# Patient Record
Sex: MALE | Race: WHITE
[De-identification: names, ages, dates, MRNs, and addresses within clinical notes are randomized per-mention and may not be internally consistent; named-entity substitution may affect disease eponyms.]

---

## 2020-07-16 ENCOUNTER — HOSPITAL ENCOUNTER (EMERGENCY)
Dept: HOSPITAL 11 - JP.ED | Age: 65
Discharge: HOME | End: 2020-07-16
Payer: MEDICARE

## 2020-07-16 DIAGNOSIS — E11.65: ICD-10-CM

## 2020-07-16 DIAGNOSIS — L97.512: ICD-10-CM

## 2020-07-16 DIAGNOSIS — Z88.8: ICD-10-CM

## 2020-07-16 DIAGNOSIS — L97.522: ICD-10-CM

## 2020-07-16 DIAGNOSIS — I48.91: ICD-10-CM

## 2020-07-16 DIAGNOSIS — I50.9: ICD-10-CM

## 2020-07-16 DIAGNOSIS — E11.40: ICD-10-CM

## 2020-07-16 DIAGNOSIS — E11.621: Primary | ICD-10-CM

## 2020-07-16 DIAGNOSIS — L03.116: ICD-10-CM

## 2020-07-16 DIAGNOSIS — L03.115: ICD-10-CM

## 2020-07-16 DIAGNOSIS — Z79.899: ICD-10-CM

## 2020-07-16 DIAGNOSIS — Z20.828: ICD-10-CM

## 2020-07-16 LAB — HBA1C BLD-MCNC: 7 % (ref 4.5–6.2)

## 2020-07-16 PROCEDURE — 99285 EMERGENCY DEPT VISIT HI MDM: CPT

## 2020-07-16 PROCEDURE — U0002 COVID-19 LAB TEST NON-CDC: HCPCS

## 2020-07-16 PROCEDURE — 99284 EMERGENCY DEPT VISIT MOD MDM: CPT

## 2020-07-16 PROCEDURE — 93005 ELECTROCARDIOGRAM TRACING: CPT

## 2020-07-16 PROCEDURE — 93010 ELECTROCARDIOGRAM REPORT: CPT

## 2020-07-16 PROCEDURE — 80053 COMPREHEN METABOLIC PANEL: CPT

## 2020-07-16 PROCEDURE — 83036 HEMOGLOBIN GLYCOSYLATED A1C: CPT

## 2020-07-16 PROCEDURE — 36415 COLL VENOUS BLD VENIPUNCTURE: CPT

## 2020-07-16 PROCEDURE — 85025 COMPLETE CBC W/AUTO DIFF WBC: CPT

## 2020-07-16 PROCEDURE — 86140 C-REACTIVE PROTEIN: CPT

## 2020-07-16 PROCEDURE — 73630 X-RAY EXAM OF FOOT: CPT

## 2020-07-16 PROCEDURE — 83880 ASSAY OF NATRIURETIC PEPTIDE: CPT

## 2020-07-16 PROCEDURE — 85651 RBC SED RATE NONAUTOMATED: CPT

## 2020-07-16 PROCEDURE — 71046 X-RAY EXAM CHEST 2 VIEWS: CPT

## 2020-07-16 PROCEDURE — 84484 ASSAY OF TROPONIN QUANT: CPT

## 2020-07-16 NOTE — EDM.PDOC
ED HPI GENERAL MEDICAL PROBLEM





- General


Chief Complaint: General


Stated Complaint: EVAL


Time Seen by Provider: 07/16/20 18:05


Source of Information: Reports: Patient


History Limitations: Reports: No Limitations





- History of Present Illness


INITIAL COMMENTS - FREE TEXT/NARRATIVE: 





Patient presents for evaluation of generalized achiness, fatigue, increasing 

foot pain, shortness of breath, chest discomfort.  He has a complicated medical 

history with multiple comorbidities.  He was last seen by a physician when he 

was hospitalized in her room approximately 1 year ago.  He states that he has 

not gone into any clinics since that time.  He has in-home personal assistance 9

hours/day for bathing and other items.  When asked why he came tonight, he 

states that he went into his local clinic but given everything he described and 

the possibility he might have the coronavirus, he was referred here.  His wife 

drove him here.  He has had low-grade feelings of warmth but no chills.  Some 

chronic intermittent coughing and shortness of breath although he spends most of

his day in a wheelchair.  He denies chest pain but describes things as chest 

discomfort.  He has had some nausea but has not vomited routinely.  He will eat 

some food but then be full quickly and will not eat anymore.  He is not been on 

a scale for a long time and he has no idea if he is losing weight, gaining 

weight or holding his own.  He has had pain in his legs for a long time he 

states since he was diagnosed with congestive heart failure.  He cannot list his

medications but his wife manages them.  He is frustrated with the fact that 

others have to take care of him.  She controls all of his medications now out of

concern that he might attempt to overdose.  Many years ago he was hospitalized 

with suicidal thoughts at the time of pending divorce.  He has had no 

psychiatric hospitalizations since then.  He considers his suicide risk low at 

this time.  As noted above he was hospitalized in [place] approximately 1 year 

ago but the details of that visit are incomplete.


Onset: Gradual


Duration: Week(s):, Chronic, Intermittent


Location: Reports: Head, Chest, Abdomen, Lower Extremity, Left, Lower Extremity,

Right, Generalized


Quality: Reports: Ache, Burning, Throbbing


Severity: Moderate


Improves with: Reports: None


Worsens with: Reports: Movement


Associated Symptoms: Reports: Chest Pain, Cough, Headaches, Loss of Appetite, 

Malaise, Nausea/Vomiting, Rash, Shortness of Breath, Weakness.  Denies: 

Diaphoresis, Fever/Chills, Syncope


  ** Bilateral Feet


Pain Score (Numeric/FACES): 10





- Related Data


                                    Allergies











Allergy/AdvReac Type Severity Reaction Status Date / Time


 


carvedilol [From Coreg] Allergy  Other Verified 07/16/20 18:37


 


gabapentin Allergy  Hives Verified 07/16/20 18:37


 


spironolactone Allergy  Hives Verified 07/16/20 18:37











Home Meds: 


                                    Home Meds





Allopurinol [Zyloprim] 300 mg PO DAILY 07/16/20 [History]


Colchicine [Colcrys] 0.6 mg PO TID PRN 07/16/20 [History]


Cyclobenzaprine [Flexeril] 10 mg PO TID PRN 07/16/20 [History]


Diltiazem HCl [Diltiazem 24Hr ER] 120 mg PO DAILY 07/16/20 [History]


Furosemide 40 mg PO DAILY 07/16/20 [History]


Magnesium Oxide 800 mg PO BID 07/16/20 [History]


Metoprolol Succinate 75 mg PO DAILY 07/16/20 [History]


Potassium Chloride 40 meq PO BID 07/16/20 [History]


Sertraline [Zoloft] 25 mg PO DAILY 07/16/20 [History]


atorvaSTATin [Lipitor] 10 mg PO DAILY 07/16/20 [History]


hydrALAZINE [Apresoline] 25 mg PO Q8H 07/16/20 [History]


metOLazone [Metolazone] 5 mg PO DAILY 07/16/20 [History]


traZODone 100 mg PO BEDTIME 07/16/20 [History]











Past Medical History


HEENT History: Reports: Other (See Below)


Other HEENT History: esophageal varices


Cardiovascular History: Reports: Afib, Heart Failure


Psychiatric History: Reports: Addiction


Endocrine/Metabolic History: Reports: Diabetes, Type II





- Infectious Disease History


Infectious Disease History: Reports: Chicken Pox





- Past Surgical History


GI Surgical History: Reports: Cholecystectomy





Social & Family History





- Tobacco Use


Smoking Status *Q: Never Smoker





- Caffeine Use


Caffeine Use: Reports: Other





- Alcohol Use


Days Per Week of Alcohol Use: 7


Number of Drinks Per Day: 3


Total Drinks Per Week: 21





- Recreational Drug Use


Recreational Drug Use: No





ED ROS GENERAL





- Review of Systems


Review Of Systems: See Below


Constitutional: Reports: Malaise, Weakness, Fatigue, Decreased Appetite.  

Denies: Diaphoresis


Respiratory: Reports: Shortness of Breath, Cough.  Denies: Wheezing


Cardiovascular: Reports: Chest Pain, Dyspnea on Exertion


GI/Abdominal: Reports: Decreased Appetite, Nausea, Vomiting.  Denies: 

Constipation, Diarrhea, Difficulty Swallowing, Hematochezia, Melena


Musculoskeletal: Reports: Foot Pain, Muscle Pain


Neurological: Reports: Headache


Psychiatric: Reports: Anxiety





ED EXAM, GENERAL





- Physical Exam


Exam: See Below


Free Text/Narrative:: 





This is an adult male seated in an oversized wheelchair.  He chronicles his 

history but sounds tired and frustrated.


Exam Limited By: No Limitations


General Appearance: Alert


Neck: Normal Inspection


Respiratory/Chest: Normal Breath Sounds.  No: No Accessory Muscle Use


Cardiovascular: Tachycardia, Irregularly Irregular


Peripheral Pulses: 2+: Posterior Tibial (L), Posterior Tibial (R)


GI/Abdominal: Soft.  No: Distended


Extremities: Other (There are varying thickness ulcers on both feet, the left 

foot shows a great toe ulcer with fissuring and black discoloration of the skin.

  There is an ulcer on the medial aspect of the left calcaneus.  The right foot 

shows an ulcer on the distal lateral forefoot.  There is diffuse tenderness.  

Some exudate present in the wounds.)


Neurological: Other (Bilateral foot neuropathy.)


Psychiatric: Depressed Mood, Flat Affect





EKG INTERPRETATION


EKG Date: 07/16/20


Time: 18:39


Rhythm: A-Fib


Axis: LAD-Left Axis Deviation


P-Wave: Absent


QRS: Normal


ST-T: Normal


QT: Normal


EKG Interpretation Comments: 





Atrial fibrillation with rapid ventricular response.





Course





- Vital Signs


Last Recorded V/S: 


                                Last Vital Signs











Temp  35.6 C L  07/16/20 17:31


 


Pulse  78   07/16/20 17:31


 


Resp  18   07/16/20 17:31


 


BP  139/89   07/16/20 17:31


 


Pulse Ox  95   07/16/20 17:31














- Orders/Labs/Meds


Orders: 


                               Active Orders 24 hr











 Category Date Time Status


 


 EKG Documentation Completion [RC] ASDIRECTED Care  07/16/20 18:35 Ordered


 


 Chest 2V [CR] Stat Exams  07/16/20 18:33 Ordered


 


 Foot Comp Min 3V Bi [CR] Stat Exams  07/16/20 18:33 Ordered


 


 CORONAVIRUS COVID-19, SIMA Routine Lab  07/16/20 18:35 Ordered


 


 GLYCOSYLATED HEMOGLOBIN,HGBA1C [CHEM] Stat Lab  07/16/20 19:55 Ordered


 


 EKG 12 Lead [EK] Routine Ther  07/16/20 18:34 Ordered











Labs: 


                                Laboratory Tests











  07/16/20 07/16/20 07/16/20 Range/Units





  18:54 18:54 18:54 


 


WBC  10.2    (4.5-11.0)  K/uL


 


RBC  5.27    (4.30-5.90)  M/uL


 


Hgb  15.0    (12.0-15.0)  g/dL


 


Hct  47.4    (40.0-54.0)  %


 


MCV  90    (80-98)  fL


 


MCH  29    (27-31)  pg


 


MCHC  32    (32-36)  %


 


Plt Count  293    (150-400)  K/uL


 


Neut % (Auto)  74 H    (36-66)  %


 


Lymph % (Auto)  12 L    (24-44)  %


 


Mono % (Auto)  12 H    (2-6)  %


 


Eos % (Auto)  1 L    (2-4)  %


 


Baso % (Auto)  1    (0-1)  %


 


ESR    87 H  (0-20)  mm/hr


 


Sodium   140   (140-148)  mmol/L


 


Potassium   4.0   (3.6-5.2)  mmol/L


 


Chloride   100   (100-108)  mmol/L


 


Carbon Dioxide   28   (21-32)  mmol/L


 


Anion Gap   12.3   (5.0-14.0)  mmol/L


 


BUN   15   (7-18)  mg/dL


 


Creatinine   1.5 H   (0.8-1.3)  mg/dL


 


Est Cr Clr Drug Dosing   47.50   mL/min


 


Estimated GFR (MDRD)   47 L   (>60)  


 


Glucose   200 H   ()  mg/dL


 


Calcium   8.6   (8.5-10.1)  mg/dL


 


Total Bilirubin   0.7   (0.2-1.0)  mg/dL


 


AST   52 H   (15-37)  U/L


 


ALT   31   (12-78)  U/L


 


Alkaline Phosphatase   180 H   ()  U/L


 


Troponin I   < 0.017   (0.000-0.056)  ng/mL


 


C-Reactive Protein   2.30 H   (0.0-0.3)  mg/dL


 


NT-Pro-B Natriuret Pep     (5-125)  pg/mL


 


Total Protein   8.2   (6.4-8.2)  g/dL


 


Albumin   2.8 L   (3.4-5.0)  g/dL


 


Globulin   5.4 H   (2.3-3.5)  g/dL


 


Albumin/Globulin Ratio   0.5 L   (1.2-2.2)  














  07/16/20 Range/Units





  18:55 


 


WBC   (4.5-11.0)  K/uL


 


RBC   (4.30-5.90)  M/uL


 


Hgb   (12.0-15.0)  g/dL


 


Hct   (40.0-54.0)  %


 


MCV   (80-98)  fL


 


MCH   (27-31)  pg


 


MCHC   (32-36)  %


 


Plt Count   (150-400)  K/uL


 


Neut % (Auto)   (36-66)  %


 


Lymph % (Auto)   (24-44)  %


 


Mono % (Auto)   (2-6)  %


 


Eos % (Auto)   (2-4)  %


 


Baso % (Auto)   (0-1)  %


 


ESR   (0-20)  mm/hr


 


Sodium   (140-148)  mmol/L


 


Potassium   (3.6-5.2)  mmol/L


 


Chloride   (100-108)  mmol/L


 


Carbon Dioxide   (21-32)  mmol/L


 


Anion Gap   (5.0-14.0)  mmol/L


 


BUN   (7-18)  mg/dL


 


Creatinine   (0.8-1.3)  mg/dL


 


Est Cr Clr Drug Dosing   mL/min


 


Estimated GFR (MDRD)   (>60)  


 


Glucose   ()  mg/dL


 


Calcium   (8.5-10.1)  mg/dL


 


Total Bilirubin   (0.2-1.0)  mg/dL


 


AST   (15-37)  U/L


 


ALT   (12-78)  U/L


 


Alkaline Phosphatase   ()  U/L


 


Troponin I   (0.000-0.056)  ng/mL


 


C-Reactive Protein   (0.0-0.3)  mg/dL


 


NT-Pro-B Natriuret Pep  1065 H  (5-125)  pg/mL


 


Total Protein   (6.4-8.2)  g/dL


 


Albumin   (3.4-5.0)  g/dL


 


Globulin   (2.3-3.5)  g/dL


 


Albumin/Globulin Ratio   (1.2-2.2)  














- Re-Assessments/Exams


Free Text/Narrative Re-Assessment/Exam: 





07/16/20 20:01


I reviewed findings with patient and his wife.  Blood glucose is 200.  Troponin 

is okay.  He has renal function shows a creatinine of 1.5.  His wife has some 

recall for medications that he takes however he has not been taking any of them 

regularly.  He is insistent that he does not want to be hospitalized.  He is on 

only 120 mg of sustained-release diltiazem.  I am sending him with a 

prescription for 14 tablets of the 180 mg strength.  I discussed that once his 

heart rate is more controlled, he will have more energy.  His wife questions the

 dose currently of his metoprolol.  I reminded the patient and his wife several 

times that consistency is the key to getting him to feel better.  He lists a 

dose of only 25 mg of sertraline.  Given his frustration with his chronic 

conditions, he needs to take that every day, which she has not been.  After 1 

week, he could increase the dose to 50 mg and likely it will have to go higher. 

 His ulcers will need evaluation by podiatry or surgery and likely will need 

some debridement.  He should schedule a recheck time with his primary care 

provider for 10 to 14 days from now.  If he if he feels worse in any way, he can

 return here.  Reviewed with both of them that there is no quick, simple 

solution to getting him to feel better.





Departure





- Departure


Time of Disposition: 19:52


Disposition: Home, Self-Care 01


Condition: Fair


Clinical Impression: 


 Atrial fibrillation with rapid ventricular response, Hyperglycemia, Cellulitis,

 CHF, Congestive heart failure





Diabetic foot ulcers


Qualifiers:


 Diabetic foot ulcer location: unspecified part of foot Diabetes mellitus type: 

type 2 Laterality: unspecified laterality Non-pressure ulcer stage: with fat 

layer exposed Qualified Code(s): E11.621 - Type 2 diabetes mellitus with foot 

ulcer








- Discharge Information


Instructions:  Preventing Diabetes Mellitus Complications


Referrals: 


Sven Mills NP [Primary Care Provider] - 


Forms:  ED Department Discharge


Additional Instructions: 


Take a dose of your diltiazem when you get home tonight.  Tomorrow, fill the 

prescriptions for the antibiotic for your feet and the new higher dose of 

diltiazem.  Continue your other medicines the same.  Make an appointment for 10 

to 14 days from now with Dr. Machuca in [place].  You need to be on all of your 

medicines regularly for 10 days in order to best figure out how to help you.  

There are several conditions that need tuning up and only time and effort will 

help that.  Your COVID-19 test will be available in about a week based on 

current turnaround time.  If you feel worse in any way return to your clinic in 

[place] or this emergency department.





Sepsis Event Note (ED)





- Evaluation


Sepsis Screening Result: No Definite Risk





- Focused Exam


Vital Signs: 


                                   Vital Signs











  Temp Pulse Resp BP Pulse Ox


 


 07/16/20 17:31  35.6 C L  78  18  139/89  95


 


 07/16/20 17:24  35.6 C L  78  18  139/89  95














- My Orders


Last 24 Hours: 


My Active Orders





07/16/20 18:33


Chest 2V [CR] Stat 


Foot Comp Min 3V Bi [CR] Stat 





07/16/20 18:34


EKG 12 Lead [EK] Routine 





07/16/20 18:35


EKG Documentation Completion [RC] ASDIRECTED 


CORONAVIRUS COVID-19, SIMA Routine 





07/16/20 19:55


GLYCOSYLATED HEMOGLOBIN,HGBA1C [CHEM] Stat 














- Assessment/Plan


Last 24 Hours: 


My Active Orders





07/16/20 18:33


Chest 2V [CR] Stat 


Foot Comp Min 3V Bi [CR] Stat 





07/16/20 18:34


EKG 12 Lead [EK] Routine 





07/16/20 18:35


EKG Documentation Completion [RC] ASDIRECTED 


CORONAVIRUS COVID-19, SIMA Routine 





07/16/20 19:55


GLYCOSYLATED HEMOGLOBIN,HGBA1C [CHEM] Stat

## 2020-07-17 NOTE — CR
FOOT RIGHT 3 views

 

CLINICAL HISTORY:Multiple ulcers, bone pain

 

FINDINGS:There is a large the ulceration medial to the first MTP joint. There is

mild diffuse osteophytic changes. No definite focal lesion is identified.

 

Impression: Large ulcer base of first toe.

 

No underlying lytic lesion is identified. If osteomyelitis is suspected, a 3

phase bone scan is consideration on a nonemergent basis

 

Mild osteoarthritic change

## 2020-07-17 NOTE — CR
CHEST: 2 view

 

CLINICAL HISTORY:Chest pain

 

COMPARISON:None

 

FINDINGS: There is moderate breathing motion on the lateral image. Heart is

mildly enlarged. No infiltrate effusion or pneumothorax is seen. There is

granuloma in the left midlung field. There are atherosclerotic changes in the

aorta.

 

Impression: Mild cardiomegaly

 

No acute cardiopulmonary process.

## 2020-07-19 ENCOUNTER — HOSPITAL ENCOUNTER (INPATIENT)
Dept: HOSPITAL 11 - JP.ED | Age: 65
LOS: 3 days | Discharge: HOME | DRG: 871 | End: 2020-07-22
Attending: INTERNAL MEDICINE | Admitting: INTERNAL MEDICINE
Payer: MEDICARE

## 2020-07-19 DIAGNOSIS — R65.20: ICD-10-CM

## 2020-07-19 DIAGNOSIS — I50.9: ICD-10-CM

## 2020-07-19 DIAGNOSIS — Z90.49: ICD-10-CM

## 2020-07-19 DIAGNOSIS — E11.9: ICD-10-CM

## 2020-07-19 DIAGNOSIS — E11.621: ICD-10-CM

## 2020-07-19 DIAGNOSIS — A41.9: Primary | ICD-10-CM

## 2020-07-19 DIAGNOSIS — L03.116: ICD-10-CM

## 2020-07-19 DIAGNOSIS — I48.0: ICD-10-CM

## 2020-07-19 DIAGNOSIS — Z88.8: ICD-10-CM

## 2020-07-19 DIAGNOSIS — L97.422: ICD-10-CM

## 2020-07-19 DIAGNOSIS — Z79.82: ICD-10-CM

## 2020-07-19 DIAGNOSIS — E87.5: ICD-10-CM

## 2020-07-19 DIAGNOSIS — R40.1: ICD-10-CM

## 2020-07-19 DIAGNOSIS — Z66: ICD-10-CM

## 2020-07-19 DIAGNOSIS — E66.01: ICD-10-CM

## 2020-07-19 DIAGNOSIS — Z79.84: ICD-10-CM

## 2020-07-19 DIAGNOSIS — E11.628: ICD-10-CM

## 2020-07-19 DIAGNOSIS — Z79.899: ICD-10-CM

## 2020-07-19 DIAGNOSIS — N17.1: ICD-10-CM

## 2020-07-19 DIAGNOSIS — Z79.01: ICD-10-CM

## 2020-07-19 DIAGNOSIS — Z20.828: ICD-10-CM

## 2020-07-19 PROCEDURE — 84484 ASSAY OF TROPONIN QUANT: CPT

## 2020-07-19 PROCEDURE — 36600 WITHDRAWAL OF ARTERIAL BLOOD: CPT

## 2020-07-19 PROCEDURE — 36415 COLL VENOUS BLD VENIPUNCTURE: CPT

## 2020-07-19 PROCEDURE — 96368 THER/DIAG CONCURRENT INF: CPT

## 2020-07-19 PROCEDURE — U0002 COVID-19 LAB TEST NON-CDC: HCPCS

## 2020-07-19 PROCEDURE — 83605 ASSAY OF LACTIC ACID: CPT

## 2020-07-19 PROCEDURE — 93005 ELECTROCARDIOGRAM TRACING: CPT

## 2020-07-19 PROCEDURE — 87040 BLOOD CULTURE FOR BACTERIA: CPT

## 2020-07-19 PROCEDURE — 51702 INSERT TEMP BLADDER CATH: CPT

## 2020-07-19 PROCEDURE — 82803 BLOOD GASES ANY COMBINATION: CPT

## 2020-07-19 PROCEDURE — 85025 COMPLETE CBC W/AUTO DIFF WBC: CPT

## 2020-07-19 PROCEDURE — 99285 EMERGENCY DEPT VISIT HI MDM: CPT

## 2020-07-19 PROCEDURE — 93010 ELECTROCARDIOGRAM REPORT: CPT

## 2020-07-19 PROCEDURE — 80305 DRUG TEST PRSMV DIR OPT OBS: CPT

## 2020-07-19 PROCEDURE — 81001 URINALYSIS AUTO W/SCOPE: CPT

## 2020-07-19 PROCEDURE — 96365 THER/PROPH/DIAG IV INF INIT: CPT

## 2020-07-19 PROCEDURE — 70450 CT HEAD/BRAIN W/O DYE: CPT

## 2020-07-19 PROCEDURE — 80053 COMPREHEN METABOLIC PANEL: CPT

## 2020-07-19 PROCEDURE — 71250 CT THORAX DX C-: CPT

## 2020-07-19 RX ADMIN — Medication SCH: at 20:42

## 2020-07-19 RX ADMIN — INSULIN LISPRO SCH UNIT: 100 INJECTION, SOLUTION INTRAVENOUS; SUBCUTANEOUS at 17:54

## 2020-07-19 RX ADMIN — LORAZEPAM PRN MG: 2 INJECTION INTRAMUSCULAR; INTRAVENOUS at 20:30

## 2020-07-19 NOTE — CRLCT
Mental status change.



TECHNIQUE:



Noncontrast CT chest.



No comparison studies are available. 



Findings: 



Normal caliber thoracic aorta. The heart is enlarged. There is no 

pericardial effusion. No mediastinal or hilar adenopathy.



Granuloma in the left upper lobe minimal basilar atelectasis. No effusion.



Nodular contour to the liver. Cholecystectomy.



Spleen adrenal glands unremarkable pancreas is unremarkable no suspicious 

bony lesions.



IMPRESSION:



No acute pulmonary findings.



Nodular contour to the liver which may reflect cirrhosis.



Please note that all CT scans at this facility use dose modulation, 

iterative reconstruction, and/or weight-based dosing when appropriate to 

reduce radiation dose to as low as reasonably achievable.



Dictated by Lenora Watters MD @ Jul 19 2020  2:02PM



Signed by Dr. Lenora Watters @ Jul 19 2020  2:05PM

## 2020-07-19 NOTE — PCM.HP.2
H&P History of Present Illness





- General


Date of Service: 07/19/20


Admit Problem/Dx: 


                           Admission Diagnosis/Problem





Admission Diagnosis/Problem      Acute kidney injury








Source of Information: Provider.  No: Patient


History Limitations: Reports: Altered Mental Status





- History of Present Illness


Initial Comments - Free Text/Narative: 





CC: I can't breathe





HPI: Jose to the ER via ambulance from his home. He is very lethargic and able 

to provide only minimal hx. history is gathered from his emergency room 

providers and they did receive information from his significant other and pa

ramedics.  Patient was seen in the emergency room a few days ago with aches and 

pains and just generally not feeling well.  He was noted to have a fast rate 

with his atrial fibrillation and his diltiazem was increased.  He did not want 

to be admitted to the hospital at this time though there was some concern about 

cellulitis around 1 of his diabetic foot ulcers, notably on the right foot.  He 

was sent home with antibiotics.  Last night his significant other reported that 

he vomited after eating a small amount of supper.  He had multiple rounds of 

emesis and then fell asleep in his wheelchair.  This morning he was very 

lethargic so she called 911.





Work-up in the emergency room has raised concern for sepsis with significant 

leukocytosis and lactic acidosis though the patient's vital signs are stable.  

No obvious source of infection has been found so far.  His kidney function is 

down considerably from his baseline.  He has received IV antibiotics.  Cultures 

have been obtained.  He has received IV fluids.





- Related Data


Allergies/Adverse Reactions: 


                                    Allergies











Allergy/AdvReac Type Severity Reaction Status Date / Time


 


carvedilol [From Coreg] Allergy  Other Verified 07/16/20 18:37


 


gabapentin Allergy  Hives Verified 07/16/20 18:37


 


spironolactone Allergy  Hives Verified 07/16/20 18:37











Home Medications: 


                                    Home Meds





Allopurinol [Zyloprim] 300 mg PO DAILY 07/16/20 [History]


Colchicine [Colcrys] 0.6 mg PO TID PRN 07/16/20 [History]


Cyclobenzaprine [Flexeril] 10 mg PO TID PRN 07/16/20 [History]


Diltiazem HCl [Diltiazem 24Hr ER] 120 mg PO DAILY 07/16/20 [History]


Furosemide 40 mg PO DAILY 07/16/20 [History]


Magnesium Oxide 800 mg PO BID 07/16/20 [History]


Metoprolol Succinate 75 mg PO DAILY 07/16/20 [History]


Potassium Chloride 40 meq PO BID 07/16/20 [History]


Sertraline [Zoloft] 25 mg PO DAILY 07/16/20 [History]


atorvaSTATin [Lipitor] 10 mg PO DAILY 07/16/20 [History]


hydrALAZINE [Apresoline] 25 mg PO Q8H 07/16/20 [History]


metOLazone [Metolazone] 5 mg PO DAILY 07/16/20 [History]


traZODone 100 mg PO BEDTIME 07/16/20 [History]











Past Medical History


HEENT History: Reports: Other (See Below)


Other HEENT History: esophageal varices


Cardiovascular History: Reports: Afib, Heart Failure


Psychiatric History: Reports: Addiction


Endocrine/Metabolic History: Reports: Diabetes, Type II





- Infectious Disease History


Infectious Disease History: Reports: Chicken Pox





- Past Surgical History


GI Surgical History: Reports: Cholecystectomy





Social & Family History





- Family History


Family Medical History: Unobtainable (pt minimally responsive)





- Tobacco Use


Smoking Status *Q: Unknown Ever Smoked





- Caffeine Use


Caffeine Use: Reports: Other


Caffeine Use Comment: unable to obtain





H&P Review of Systems





- Review of Systems:


Review Of Systems: Unable To Obtain


Reason Not Obtained: minimally responsive, questions he did answer in HPI





Exam





- Exam


Exam: See Below





- Vital Signs


Vital Signs: 


                                Last Vital Signs











Temp  36.2 C   07/19/20 13:25


 


Pulse  50 L  07/19/20 14:25


 


Resp  10 L  07/19/20 14:25


 


BP  115/79   07/19/20 14:25


 


Pulse Ox  96   07/19/20 14:25











Weight: 151.93 kg





- Exam


Quality Assessment: No: Supplemental Oxygen


General: Alert, Cooperative, Lethargic.  No: Oriented, Mild Distress


HEENT: Conjunctiva Clear.  No: Mucosa Moist & Pink (dry), Scleral Icterus


Neck: Supple, Trachea Midline.  No: JVD


Lungs: Clear to Auscultation, Normal Respiratory Effort


Cardiovascular: Regular Rhythm, Bradycardia.  No: Systolic Murmur, Gallop/S3


GI/Abdominal Exam: Normal Bowel Sounds, Soft, Non-Tender, No Distention, Other 

(obese)


Extremities: Pedal Edema, Other (Large ulcer base of right great toe medially. 

No exudate or erythema. Large ulcer medial left heal with no drainage or e

rythema. No fluctuance around either wound. small abrasions left great toe).  

No: Joint Swelling, Increased Warmth





- Patient Data


Lab Results Last 24 hrs: 


                         Laboratory Results - last 24 hr











  07/19/20 07/19/20 07/19/20 Range/Units





  12:52 13:00 13:00 


 


WBC   20.5 H   (4.5-11.0)  K/uL


 


RBC   4.77   (4.30-5.90)  M/uL


 


Hgb   13.8   (12.0-15.0)  g/dL


 


Hct   44.6   (40.0-54.0)  %


 


MCV   94   (80-98)  fL


 


MCH   29   (27-31)  pg


 


MCHC   31 L   (32-36)  %


 


Plt Count   341   (150-400)  K/uL


 


Neut % (Auto)   93 H   (36-66)  %


 


Lymph % (Auto)   3 L   (24-44)  %


 


Mono % (Auto)   4   (2-6)  %


 


Eos % (Auto)   0 L   (2-4)  %


 


Baso % (Auto)   0   (0-1)  %


 


Puncture Site  Rt brachial    


 


ABG pH  7.363    (7.350-7.450)  


 


ABG pCO2  40.4    (35.0-42.0)  mmHg


 


ABG pO2  101.0 H    (75.0-100.0)  mmHg


 


ABG HCO3  22.4    (22.0-26.0)  mmol/L


 


ABG Total CO2  20.0 L    (23.0-27.0)  mmol/L


 


ABG O2 Saturation  97.1    (95.0-98.0)  %


 


ABG O2 Content  18.6    (15.0-23.0)  %vol


 


ABG Base Excess  -2.3    mm/L


 


ABG Hemoglobin  13.8    (13.5-18.0)  g/dL


 


ABG Oxyhemoglobin  94.9    %


 


ABG Carboxyhemoglobin  1.6    (0.0-1.6)  %


 


ABG Methemoglobin  0.7    %


 


Stefano Test  N/a    


 


O2 Delivery Device  Room air    


 


Sodium    134 L  (140-148)  mmol/L


 


Potassium    5.5 H  (3.6-5.2)  mmol/L


 


Chloride    96 L  (100-108)  mmol/L


 


Carbon Dioxide    24  (21-32)  mmol/L


 


Anion Gap    19.5 H  (5.0-14.0)  mmol/L


 


BUN    22 H  (7-18)  mg/dL


 


Creatinine    2.7 H D  (0.8-1.3)  mg/dL


 


Est Cr Clr Drug Dosing    TNP  


 


Estimated GFR (MDRD)    24 L  (>60)  


 


Glucose    215 H  ()  mg/dL


 


Lactic Acid     (0.4-2.0)  mmol/L


 


Calcium    8.3 L  (8.5-10.1)  mg/dL


 


Total Bilirubin    1.1 H D  (0.2-1.0)  mg/dL


 


AST    62 H  (15-37)  U/L


 


ALT    45  (12-78)  U/L


 


Alkaline Phosphatase    179 H  ()  U/L


 


Troponin I    < 0.017  (0.000-0.056)  ng/mL


 


Total Protein    8.6 H  (6.4-8.2)  g/dL


 


Albumin    3.1 L  (3.4-5.0)  g/dL


 


Globulin    5.5 H  (2.3-3.5)  g/dL


 


Albumin/Globulin Ratio    0.6 L  (1.2-2.2)  


 


Urine Color     (YELLOW)  


 


Urine Appearance     (CLEAR)  


 


Urine pH     (5.0-8.0)  


 


Ur Specific Gravity     (1.008-1.030)  


 


Urine Protein     (NEGATIVE)  mg/dL


 


Urine Glucose (UA)     (NEGATIVE)  mg/dL


 


Urine Ketones     (NEGATIVE)  mg/dL


 


Urine Occult Blood     (NEGATIVE)  


 


Urine Nitrite     (NEGATIVE)  


 


Urine Bilirubin     (NEGATIVE)  


 


Urine Urobilinogen     (0.2-1.0)  EU/dL


 


Ur Leukocyte Esterase     (NEGATIVE)  


 


Urine RBC     (0-5)  


 


Urine WBC     (0-5)  


 


Ur Epithelial Cells     


 


Amorphous Sediment     


 


Urine Bacteria     


 


Urine Mucus     


 


Urine Other     


 


SARS Virus RNA (PCR)     (NEGATIVE)  














  07/19/20 07/19/20 07/19/20 Range/Units





  13:00 14:28 14:46 


 


WBC     (4.5-11.0)  K/uL


 


RBC     (4.30-5.90)  M/uL


 


Hgb     (12.0-15.0)  g/dL


 


Hct     (40.0-54.0)  %


 


MCV     (80-98)  fL


 


MCH     (27-31)  pg


 


MCHC     (32-36)  %


 


Plt Count     (150-400)  K/uL


 


Neut % (Auto)     (36-66)  %


 


Lymph % (Auto)     (24-44)  %


 


Mono % (Auto)     (2-6)  %


 


Eos % (Auto)     (2-4)  %


 


Baso % (Auto)     (0-1)  %


 


Puncture Site     


 


ABG pH     (7.350-7.450)  


 


ABG pCO2     (35.0-42.0)  mmHg


 


ABG pO2     (75.0-100.0)  mmHg


 


ABG HCO3     (22.0-26.0)  mmol/L


 


ABG Total CO2     (23.0-27.0)  mmol/L


 


ABG O2 Saturation     (95.0-98.0)  %


 


ABG O2 Content     (15.0-23.0)  %vol


 


ABG Base Excess     mm/L


 


ABG Hemoglobin     (13.5-18.0)  g/dL


 


ABG Oxyhemoglobin     %


 


ABG Carboxyhemoglobin     (0.0-1.6)  %


 


ABG Methemoglobin     %


 


Stefano Test     


 


O2 Delivery Device     


 


Sodium     (140-148)  mmol/L


 


Potassium     (3.6-5.2)  mmol/L


 


Chloride     (100-108)  mmol/L


 


Carbon Dioxide     (21-32)  mmol/L


 


Anion Gap     (5.0-14.0)  mmol/L


 


BUN     (7-18)  mg/dL


 


Creatinine     (0.8-1.3)  mg/dL


 


Est Cr Clr Drug Dosing     


 


Estimated GFR (MDRD)     (>60)  


 


Glucose     ()  mg/dL


 


Lactic Acid  4.6 H    (0.4-2.0)  mmol/L


 


Calcium     (8.5-10.1)  mg/dL


 


Total Bilirubin     (0.2-1.0)  mg/dL


 


AST     (15-37)  U/L


 


ALT     (12-78)  U/L


 


Alkaline Phosphatase     ()  U/L


 


Troponin I     (0.000-0.056)  ng/mL


 


Total Protein     (6.4-8.2)  g/dL


 


Albumin     (3.4-5.0)  g/dL


 


Globulin     (2.3-3.5)  g/dL


 


Albumin/Globulin Ratio     (1.2-2.2)  


 


Urine Color    Yellow  (YELLOW)  


 


Urine Appearance    Cloudy A  (CLEAR)  


 


Urine pH    5.5  (5.0-8.0)  


 


Ur Specific Gravity    1.025  (1.008-1.030)  


 


Urine Protein    >=300 H  (NEGATIVE)  mg/dL


 


Urine Glucose (UA)    Negative  (NEGATIVE)  mg/dL


 


Urine Ketones    Negative  (NEGATIVE)  mg/dL


 


Urine Occult Blood    Negative  (NEGATIVE)  


 


Urine Nitrite    Negative  (NEGATIVE)  


 


Urine Bilirubin    Negative  (NEGATIVE)  


 


Urine Urobilinogen    0.2  (0.2-1.0)  EU/dL


 


Ur Leukocyte Esterase    Negative  (NEGATIVE)  


 


Urine RBC    0-5  (0-5)  


 


Urine WBC    0-5  (0-5)  


 


Ur Epithelial Cells    Few  


 


Amorphous Sediment    Packed  


 


Urine Bacteria    Not seen  


 


Urine Mucus    Not seen  


 


Urine Other    See note  


 


SARS Virus RNA (PCR)   Negative   (NEGATIVE)  











Result Diagrams: 


                                 07/19/20 13:00





                                 07/19/20 13:00


Imaging Impressions Last 24 hrs: 


CT chest-images personally reviewed-no mass, infiltrate or effusion. heart size 

normal. obesity. 





Head CT-images personally reviewed-no acute intracranial findings 





EKG INTERPRETATION


EKG Date: 07/19/20


Rhythm: A-Fib


Rate (Beats/Min): 47


Axis: LAD-Left Axis Deviation


P-Wave: Variable


QRS: Wide


ST-T: Normal


QT: Normal


Comparison: NA - No Prior EKG


EKG Interpretation Comments: 





image personally reviewed 





Sepsis Event Note





- Evaluation


Sepsis Screening Result: No Definite Risk


Current Stage of Sepsis: Sepsis


Possible Source of Sepsis: Skin/Soft Tissue





- Focused Exam


Sepsis Event Note Statement: Focused Sepsis Exam Completed


Vital Signs: 


                                   Vital Signs











  Temp Pulse Resp BP Pulse Ox


 


 07/19/20 14:25   50 L  10 L  115/79  96


 


 07/19/20 13:55   48 L   110/83 


 


 07/19/20 13:25  36.2 C  48 L  10 L  97/73  98


 


 07/19/20 13:00  35.2 C L  51 L  10 L  112/70  93 L


 


 07/19/20 12:51  36.2 C  54 L  9 L  112/70  95











Respiratory Effort Without Exertion: Dyspneic


Heart Sounds: Other (see below) (bradycardia)


Capillary Refill, Detail: Less than/Equal to (</=) 2 Seconds


Pulse Description: 1+ Thready


Peripheral Pulse Location: Radial


Skin Exam (Focused Sepsis): Normal Turgor


Date Exam was Performed: 07/19/20


Time Exam was Performed: 15:33





*Q Meaningful Use (ADM)





- VTE Risk Assess *Q


Each Risk Factor Represents 1 Point: Swollen Legs, Current, Obesity ( BMI > 25 

kg/m2), Sepsis


Total Score 1 Point Risk Factors: 3


Each Risk Factor Represents 2 Points: Age 60 - 74 Years


Total Score 2 Point Risk Factors: 2


Each Risk Factor Represents 3 Points: None


Total Score 3 Point Risk Factors: 0


Each Risk Factor Represents 5 Points: None


Total Score 5 Point Risk Factors: 0


Venous Thromboembolism Risk Factor Score *Q: 5





- Problem List


(1) Acute kidney injury


SNOMED Code(s): 06984369, 62658320


   ICD Code: N17.9 - ACUTE KIDNEY FAILURE, UNSPECIFIED   Status: Acute   Current

 Visit: Yes   





(2) Diabetic foot ulcer


SNOMED Code(s): 011111499


   ICD Code: E11.621 - TYPE 2 DIABETES MELLITUS WITH FOOT ULCER; L97.509 - NON-

PRESSURE CHRONIC ULCER OTH PRT UNSP FOOT W UNSP SEVERITY   Status: Acute   

Current Visit: Yes   


Qualifiers: 


   Diabetic foot ulcer location: heel   Diabetes mellitus type: type 2   

Laterality: left   Non-pressure ulcer stage: with fat layer exposed   Qualified 

Code(s): E11.621 - Type 2 diabetes mellitus with foot ulcer; L97.422 - Non-

pressure chronic ulcer of left heel and midfoot with fat layer exposed   





(3) Diabetic foot ulcers


SNOMED Code(s): 713272345


   ICD Code: E11.621 - TYPE 2 DIABETES MELLITUS WITH FOOT ULCER; L97.509 - NON-

PRESSURE CHRONIC ULCER OTH PRT UNSP FOOT W UNSP SEVERITY   Status: Acute   

Current Visit: No   


Qualifiers: 


   Diabetic foot ulcer location: toe   Diabetes mellitus type: type 2   

Laterality: right   Non-pressure ulcer stage: with fat layer exposed   Qualified

 Code(s): E11.621 - Type 2 diabetes mellitus with foot ulcer; L97.512 - Non-

pressure chronic ulcer of other part of right foot with fat layer exposed   





(4) Sepsis


SNOMED Code(s): 63275088


   ICD Code: A41.9 - SEPSIS, UNSPECIFIED ORGANISM   Status: Acute   Current 

Visit: Yes   


Qualifiers: 


   Sepsis type: sepsis due to unspecified organism   Sepsis acute organ dysf

unction status: with acute organ dysfunction   Severe sepsis acute organ dy

sfunction type: acute renal failure   Acute renal failure type: with acute renal

 cortical necrosis   Severe sepsis shock status: without septic shock   

Qualified Code(s): A41.9 - Sepsis, unspecified organism; R65.20 - Severe sepsis 

without septic shock; N17.1 - Acute kidney failure with acute cortical necrosis 

  





(5) Hyperkalemia


SNOMED Code(s): 13911161


   ICD Code: E87.5 - HYPERKALEMIA   Status: Acute   Current Visit: Yes   





(6) Atrial fibrillation


SNOMED Code(s): 63600232


   ICD Code: I48.91 - UNSPECIFIED ATRIAL FIBRILLATION   Status: Chronic   

Current Visit: Yes   


Qualifiers: 


   Atrial fibrillation type: paroxysmal   Qualified Code(s): I48.0 - Paroxysmal 

atrial fibrillation   





(7) Morbid obesity with BMI of 50.0-59.9, adult


SNOMED Code(s): 393096002, 01264133496549


   ICD Code: E66.01 - MORBID (SEVERE) OBESITY DUE TO EXCESS CALORIES; Z68.43 - 

BODY MASS INDEX (BMI) 50.0-59.9, ADULT   Status: Chronic   Current Visit: Yes   


Problem List Initiated/Reviewed/Updated: Yes


Orders Last 24hrs: 


                               Active Orders 24 hr











 Category Date Time Status


 


 Patient Status Manage Transfer [TRANSFER] Routine ADT  07/19/20 15:02 Ordered


 


 EKG Documentation Completion [RC] ASDIRECTED Care  07/19/20 12:56 Active


 


 Insert Richards Catheter [Insert Urinary Catheter] [OM.PC] Care  07/19/20 14:15 

Ordered





 Q24H   


 


 Urinary Catheter Assessment [RC] ASDIRECTED Care  07/19/20 14:11 Active


 


 Foot Comp Min 3V Lt [CR] Stat Exams  07/19/20 15:00 Ordered


 


 CULTURE BLOOD [BC] Urgent Lab  07/19/20 13:48 Received


 


 CULTURE BLOOD [BC] Urgent Lab  07/19/20 14:04 Received


 


 DRUG SCREEN, URINE [URCHEM] Stat Lab  07/19/20 15:01 Ordered


 


 Sodium Chloride 0.9% [Normal Saline] 1,000 ml Med  07/19/20 13:45 Active





 IV ASDIRECTED   


 


 Sodium Chloride 0.9% [Normal Saline] 1,000 ml Med  07/19/20 15:00 Active





 IV ASDIRECTED   


 


 Vancomycin 2 gm Med  07/19/20 14:30 Active





 Sodium Chloride 0.9% [Normal Saline] 500 ml   





 IV ONETIME   


 


 Blood Culture x2 Reflex Set [OM.PC] Urgent Oth  07/19/20 13:31 Ordered


 


 Resuscitation Status Routine Resus Stat  07/19/20 15:05 Ordered


 


 EKG 12 Lead [EK] Routine Ther  07/19/20 12:56 Ordered








                                Medication Orders





Sodium Chloride (Normal Saline)  1,000 mls @ 1,000 mls/hr IV ASDIRECTED [facility], [facility]


   Last Admin: 07/19/20 13:52  Dose: 1,000 mls/hr


   Documented by: RONALD


Vancomycin HCl 2 gm/ Sodium (Chloride)  500 mls @ 250 mls/hr IV ONETIME ONE


   Stop: 07/19/20 16:29


   Last Admin: 07/19/20 14:23  Dose: 250 mls/hr


   Documented by: RONALD


Sodium Chloride (Normal Saline)  1,000 mls @ 700 mls/hr IV ASDIRECTED [facility]


   Stop: 07/19/20 16:26


   Last Admin: 07/19/20 15:13  Dose: 700 mls/hr


   Documented by: RONALD








Assessment/Plan Comment:: 





ASSESSMENT AND PLAN - 





Sepsis-I suspect 1 of his diabetic foot ulcers or potentially both are his 

source of infection.  He has significant leukocytosis and lactic acid was 

greater than 4.  I did not find any areas of erythema or fluctuance on my 

examination.  Left foot x-ray is pending.  Urine was clear.  Chest was clear.  

Head CT unremarkable.  Examination otherwise benign.  Cultures have been 

obtained and broad-spectrum antibiotics administered.  He has only received 

about half of this 30 mL/kg bolus because of his history of congestive heart 

failure and sensitivity to fluid.  He is not currently hypotensive or 

tachycardic.  Any additional fluid boluses will be administered based on lactic 

acid level which will be collected every 4 hours.


-Follow-up left foot x-ray


-Consider additional imaging of both feet


-Continue vancomycin and meropenem


-Follow-up cultures


-Serial lactic acid levels





Acute kidney injury-creatinine significantly elevated from baseline in the set

ting of sepsis.


-Management as above with labs in the morning





Hyperkalemia-mild, probably associated with the acute kidney injury and should 

improve with fluids.


-Repeat level in the morning





History of congestive heart failure-compensated at this time but at high risk 

for decompensation if too aggressive of a volume resuscitation is undertaken.


-Restart beta-blocker and additional management as able/needed





Atrial fibrillation-unclear duration, currently rate controlled.


-Hold beta-blocker and calcium channel blocker at this time





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  

Recent hemoglobin A1c was 7.


-Hold metformin


-Low-dose sliding scale





Morbid obesity with BMI greater than 50





Maintenance issues - 


- DVT prophylaxis -enoxaparin


- GI prophylaxis -PPI


- Nutrition -nothing by mouth until he wakes up


- Richards catheter -placed in the emergency room for strict intake and output 

monitoring in a critical patient





CODE STATUS -DNR/DNI per advanced directive





Admission justification -this patient will be admitted for inpatient services 

and is medically appropriate meeting medical necessity for inpatient admission 

as outlined in my documentation.  I reasonably expect the patient will require 

inpatient services that span a period time over 2 midnights. I reasonably expect

 this patient to be discharged or transferred within 96 hours after admission to

 the [facility].





Disposition -I would anticipate discharge home after the hospital stay





Primary care physician - Sven Bush M.D.





- Mortality Measure


Prognosis:: Good

## 2020-07-19 NOTE — CRLCT
INDICATION:



Mental status change.



TECHNIQUE:



CT head without contrast.



COMPARISON:



None. 



FINDINGS:



CSF spaces: Within normal limits for age.  



Brain parenchyma and extra-axial spaces: The gray-white differentiation is 

normal.  No sign of mass, hemorrhage, or midline shift. No extra-axial 

fluid collection.  



Skull base and calvarium: The visualized paranasal sinuses and mastoid air 

cells demonstrate no acute or significant findings.  The visualized orbits 

are grossly unremarkable.  No skull fractures.  



IMPRESSION:



Unremarkable noncontrast head CT.



Dictated by Edison Goodwin MD @ 7/19/2020 2:07:22 PM



Please note that all CT scans at this facility use dose modulation, 

iterative reconstruction, and/or weight-based dosing when appropriate to 

reduce radiation dose to as low as reasonably achievable.



Dictated by: Edison Goodwin MD @ 07/19/2020 14:07:29



(Electronically Signed)

## 2020-07-19 NOTE — EDM.PDOC
ED HPI GENERAL MEDICAL PROBLEM





- General


Stated Complaint: MEDICAL VIA TRI


Time Seen by Provider: 07/19/20 12:50


Source of Information: Reports: Patient, EMS


History Limitations: Reports: Altered Mental Status, Respiratory Distress





- History of Present Illness


INITIAL COMMENTS - FREE TEXT/NARRATIVE: 





65-year-old male who was just in the emergency room a few days ago in atrial 

fibrillation with rapid ventricular response, returns with decreased mental 

status and shortness of breath.  His significant other was concerned because he 

did not seem to why communicate this morning, was sitting in his wheelchair 

relatively unresponsive and would not take his medications so she called the 

ambulance.  No fevers or chills, he has marked difficulty in speaking but was 

able to respond he "cannot breathe" to one of the nurses but I could not get him

to vocalize any responses to questions.  He does open his eyes to loud voice and

sternal rub but will not focus on anyone.  Breath sounds sound raspy and even 

mildly stridorous, but O2 sats are 95% on room air.  Blood pressure is stable.  

Temperature is 97.1.  No further history is obtainable other than he was 

recently increased his dose of diltiazem in the emergency room because of atrial

fibrillation with rapid ventricular response.


Onset: Unknown/Unsure





- Related Data


                                    Allergies











Allergy/AdvReac Type Severity Reaction Status Date / Time


 


carvedilol [From Coreg] Allergy  Other Verified 07/16/20 18:37


 


gabapentin Allergy  Hives Verified 07/16/20 18:37


 


spironolactone Allergy  Hives Verified 07/16/20 18:37











Home Meds: 


                                    Home Meds





Allopurinol [Zyloprim] 300 mg PO DAILY 07/16/20 [History]


Colchicine [Colcrys] 0.6 mg PO TID PRN 07/16/20 [History]


Cyclobenzaprine [Flexeril] 10 mg PO TID PRN 07/16/20 [History]


Diltiazem HCl [Diltiazem 24Hr ER] 120 mg PO DAILY 07/16/20 [History]


Furosemide 40 mg PO DAILY 07/16/20 [History]


Magnesium Oxide 800 mg PO BID 07/16/20 [History]


Metoprolol Succinate 75 mg PO DAILY 07/16/20 [History]


Potassium Chloride 40 meq PO BID 07/16/20 [History]


Sertraline [Zoloft] 25 mg PO DAILY 07/16/20 [History]


atorvaSTATin [Lipitor] 10 mg PO DAILY 07/16/20 [History]


hydrALAZINE [Apresoline] 25 mg PO Q8H 07/16/20 [History]


metOLazone [Metolazone] 5 mg PO DAILY 07/16/20 [History]


traZODone 100 mg PO BEDTIME 07/16/20 [History]











Past Medical History


HEENT History: Reports: Other (See Below)


Other HEENT History: esophageal varices


Cardiovascular History: Reports: Afib, Heart Failure


Psychiatric History: Reports: Addiction


Endocrine/Metabolic History: Reports: Diabetes, Type II





- Infectious Disease History


Infectious Disease History: Reports: Chicken Pox





- Past Surgical History


GI Surgical History: Reports: Cholecystectomy





Social & Family History





- Caffeine Use


Caffeine Use: Reports: Other





ED ROS GENERAL





- Review of Systems


Review Of Systems: See Below (Briefly said he could not breathe still with the 

nurses, but I could not get no further responses or review of systems from the 

patient)





ED EXAM, GENERAL





- Physical Exam


Exam: See Below


Exam Limited By: Altered Mental Status


General Appearance: Lethargic


Eye Exam: Bilateral Eye: Other (Pupils are small and equal, no disconjugate 

gaze)


Throat/Mouth: Other (Very advanced dental decay and widespread caries)


Head: Atraumatic


Neck: Supple, Non-Tender


Respiratory/Chest: Lungs Clear


Cardiovascular: Regular Rate, Rhythm, Other (Patient is bradycardic, rhythm 

sounds consistent, distant heart sounds)


GI/Abdominal: Other (Morbidly obese, reacts with no tenderness to palpation of 

the abdomen)


Extremities: Pedal Edema (Symmetric 2+ pitting edema, numerous ulcerations on 

the feet some bandaged.)


Neurological: Inattentive, Slow to Respond


Skin Exam: Warm, Dry





EKG INTERPRETATION


EKG Date: 07/19/20


Time: 13:50


Rhythm: A-Fib


ST-T: Normal


EKG Interpretation Comments: 





EKG now looks like atrial fib with slow ventricular response, initially it 

appeared to be a ventricular escape rhythm but they do not map out regularly so 

it is more likely it is atrial fibrillation.





Course





- Vital Signs


Last Recorded V/S: 


                                Last Vital Signs











Temp  94.3 F L  07/19/20 16:00


 


Pulse  52 L  07/19/20 16:00


 


Resp  8 L  07/19/20 16:00


 


BP  135/74   07/19/20 16:00


 


Pulse Ox  98   07/19/20 16:00














- Orders/Labs/Meds


Orders: 


                               Active Orders 24 hr











 Category Date Time Status


 


 Insert Richards Catheter [Insert Urinary Catheter] [OM.PC] Care  07/19/20 14:15 

Ordered





 Q24H   


 


 Urinary Catheter Assessment [RC] ASDIRECTED Care  07/19/20 14:11 Active


 


 Foot 2V Lt [CR] Stat Exams  07/19/20 15:00 Taken


 


 CULTURE BLOOD [BC] Urgent Lab  07/19/20 13:48 Received


 


 CULTURE BLOOD [BC] Urgent Lab  07/19/20 14:04 Received


 


 Sodium Chloride 0.9% [Normal Saline] 1,000 ml Med  07/19/20 15:00 Active





 IV ASDIRECTED   


 


 Vancomycin 2 gm Med  07/19/20 14:30 Active





 Sodium Chloride 0.9% [Normal Saline] 500 ml   





 IV ONETIME   


 


 Blood Culture x2 Reflex Set [OM.PC] Urgent Oth  07/19/20 13:31 Ordered


 


 EKG 12 Lead [EK] Routine Ther  07/19/20 12:56 Stop Req








                                Medication Orders





Acetaminophen (Tylenol)  650 mg PO Q4H PRN


   PRN Reason: Pain (Mild 1-3)/fever


Albuterol (Proventil Neb Soln)  2.5 mg NEB Q4H PRN


   PRN Reason: Shortness Of Breath/wheezing


Enoxaparin Sodium (Lovenox)  30 mg SUBCUT DAILY [facility]


Vancomycin HCl 2 gm/ Sodium (Chloride)  500 mls @ 250 mls/hr IV ONETIME ONE


   Stop: 07/19/20 16:29


   Last Admin: 07/19/20 14:23  Dose: 250 mls/hr


   Documented by: RONALD


Sodium Chloride (Normal Saline)  1,000 mls @ 700 mls/hr IV ASDIRECTED [facility]


   Stop: 07/19/20 16:26


   Last Admin: 07/19/20 15:13  Dose: 700 mls/hr


   Documented by: DILLONBSTA


Sodium Chloride (Normal Saline)  1,000 mls @ 125 mls/hr IV ASDIRECTED [facility]


   Last Admin: 07/19/20 16:05  Dose: 125 mls/hr


   Documented by: ELIZABETH


Vancomycin HCl 2 gm/ Sodium (Chloride)  500 mls @ 250 mls/hr IV Q24H [facility]


Meropenem 500 mg/ Sodium (Chloride)  50 mls @ 100 mls/hr IV Q12H [facility]


Insulin Human Lispro (Humalog)  0 unit SUBCUT Q6H [facility]; Protocol


Lactobacillus Rhamnosus (Culturelle)  1 cap PO BID [facility]


Lorazepam (Ativan)  0.5 mg IVPUSH Q4H PRN


   PRN Reason: Nausea/Vomiting


Magnesium Hydroxide (Milk Of Magnesia)  30 ml PO Q12H PRN


   PRN Reason: Constipation


Ondansetron HCl (Zofran)  4 mg IV Q6H PRN


   PRN Reason: Nausea/Vomiting


Ondansetron HCl (Zofran Odt)  4 mg PO Q6H PRN


   PRN Reason: Nausea able to take PO


Pantoprazole Sodium (Protonix***)  40 mg PO ACBREAKFAST [facility]


Senna/Docusate Sodium (Senna Plus)  1 tab PO BID PRN


   PRN Reason: Constipation








Labs: 


                                Laboratory Tests











  07/19/20 07/19/20 07/19/20 Range/Units





  12:52 13:00 13:00 


 


WBC   20.5 H   (4.5-11.0)  K/uL


 


RBC   4.77   (4.30-5.90)  M/uL


 


Hgb   13.8   (12.0-15.0)  g/dL


 


Hct   44.6   (40.0-54.0)  %


 


MCV   94   (80-98)  fL


 


MCH   29   (27-31)  pg


 


MCHC   31 L   (32-36)  %


 


Plt Count   341   (150-400)  K/uL


 


Neut % (Auto)   93 H   (36-66)  %


 


Lymph % (Auto)   3 L   (24-44)  %


 


Mono % (Auto)   4   (2-6)  %


 


Eos % (Auto)   0 L   (2-4)  %


 


Baso % (Auto)   0   (0-1)  %


 


Puncture Site  Rt brachial    


 


ABG pH  7.363    (7.350-7.450)  


 


ABG pCO2  40.4    (35.0-42.0)  mmHg


 


ABG pO2  101.0 H    (75.0-100.0)  mmHg


 


ABG HCO3  22.4    (22.0-26.0)  mmol/L


 


ABG Total CO2  20.0 L    (23.0-27.0)  mmol/L


 


ABG O2 Saturation  97.1    (95.0-98.0)  %


 


ABG O2 Content  18.6    (15.0-23.0)  %vol


 


ABG Base Excess  -2.3    mm/L


 


ABG Hemoglobin  13.8    (13.5-18.0)  g/dL


 


ABG Oxyhemoglobin  94.9    %


 


ABG Carboxyhemoglobin  1.6    (0.0-1.6)  %


 


ABG Methemoglobin  0.7    %


 


Stefano Test  N/a    


 


O2 Delivery Device  Room air    


 


Sodium    134 L  (140-148)  mmol/L


 


Potassium    5.5 H  (3.6-5.2)  mmol/L


 


Chloride    96 L  (100-108)  mmol/L


 


Carbon Dioxide    24  (21-32)  mmol/L


 


Anion Gap    19.5 H  (5.0-14.0)  mmol/L


 


BUN    22 H  (7-18)  mg/dL


 


Creatinine    2.7 H D  (0.8-1.3)  mg/dL


 


Est Cr Clr Drug Dosing    TNP  


 


Estimated GFR (MDRD)    24 L  (>60)  


 


Glucose    215 H  ()  mg/dL


 


Lactic Acid     (0.4-2.0)  mmol/L


 


Calcium    8.3 L  (8.5-10.1)  mg/dL


 


Total Bilirubin    1.1 H D  (0.2-1.0)  mg/dL


 


AST    62 H  (15-37)  U/L


 


ALT    45  (12-78)  U/L


 


Alkaline Phosphatase    179 H  ()  U/L


 


Troponin I    < 0.017  (0.000-0.056)  ng/mL


 


Total Protein    8.6 H  (6.4-8.2)  g/dL


 


Albumin    3.1 L  (3.4-5.0)  g/dL


 


Globulin    5.5 H  (2.3-3.5)  g/dL


 


Albumin/Globulin Ratio    0.6 L  (1.2-2.2)  


 


Urine Color     (YELLOW)  


 


Urine Appearance     (CLEAR)  


 


Urine pH     (5.0-8.0)  


 


Ur Specific Gravity     (1.008-1.030)  


 


Urine Protein     (NEGATIVE)  mg/dL


 


Urine Glucose (UA)     (NEGATIVE)  mg/dL


 


Urine Ketones     (NEGATIVE)  mg/dL


 


Urine Occult Blood     (NEGATIVE)  


 


Urine Nitrite     (NEGATIVE)  


 


Urine Bilirubin     (NEGATIVE)  


 


Urine Urobilinogen     (0.2-1.0)  EU/dL


 


Ur Leukocyte Esterase     (NEGATIVE)  


 


Urine RBC     (0-5)  


 


Urine WBC     (0-5)  


 


Ur Epithelial Cells     


 


Amorphous Sediment     


 


Urine Bacteria     


 


Urine Mucus     


 


Urine Other     


 


Urine Opiates Screen     (NEGATIVE)  


 


Ur Oxycodone Screen     (NEGATIVE)  


 


Urine Methadone Screen     (NEGATIVE)  


 


Ur Propoxyphene Screen     (NEGATIVE)  


 


Ur Barbiturates Screen     (NEGATIVE)  


 


Ur Tricyclics Screen     (NEGATIVE)  


 


Ur Phencyclidine Scrn     (NEGATIVE)  


 


Ur Amphetamine Screen     (NEGATIVE)  


 


U Methamphetamines Scrn     (NEGATIVE)  


 


Urine MDMA Screen     (NEGATIVE)  


 


U Benzodiazepines Scrn     (NEGATIVE)  


 


U Cocaine Metab Screen     (NEGATIVE)  


 


U Marijuana (THC) Screen     (NEGATIVE)  


 


SARS Virus RNA (PCR)     (NEGATIVE)  














  07/19/20 07/19/20 07/19/20 Range/Units





  13:00 14:28 14:46 


 


WBC     (4.5-11.0)  K/uL


 


RBC     (4.30-5.90)  M/uL


 


Hgb     (12.0-15.0)  g/dL


 


Hct     (40.0-54.0)  %


 


MCV     (80-98)  fL


 


MCH     (27-31)  pg


 


MCHC     (32-36)  %


 


Plt Count     (150-400)  K/uL


 


Neut % (Auto)     (36-66)  %


 


Lymph % (Auto)     (24-44)  %


 


Mono % (Auto)     (2-6)  %


 


Eos % (Auto)     (2-4)  %


 


Baso % (Auto)     (0-1)  %


 


Puncture Site     


 


ABG pH     (7.350-7.450)  


 


ABG pCO2     (35.0-42.0)  mmHg


 


ABG pO2     (75.0-100.0)  mmHg


 


ABG HCO3     (22.0-26.0)  mmol/L


 


ABG Total CO2     (23.0-27.0)  mmol/L


 


ABG O2 Saturation     (95.0-98.0)  %


 


ABG O2 Content     (15.0-23.0)  %vol


 


ABG Base Excess     mm/L


 


ABG Hemoglobin     (13.5-18.0)  g/dL


 


ABG Oxyhemoglobin     %


 


ABG Carboxyhemoglobin     (0.0-1.6)  %


 


ABG Methemoglobin     %


 


Stefano Test     


 


O2 Delivery Device     


 


Sodium     (140-148)  mmol/L


 


Potassium     (3.6-5.2)  mmol/L


 


Chloride     (100-108)  mmol/L


 


Carbon Dioxide     (21-32)  mmol/L


 


Anion Gap     (5.0-14.0)  mmol/L


 


BUN     (7-18)  mg/dL


 


Creatinine     (0.8-1.3)  mg/dL


 


Est Cr Clr Drug Dosing     


 


Estimated GFR (MDRD)     (>60)  


 


Glucose     ()  mg/dL


 


Lactic Acid  4.6 H    (0.4-2.0)  mmol/L


 


Calcium     (8.5-10.1)  mg/dL


 


Total Bilirubin     (0.2-1.0)  mg/dL


 


AST     (15-37)  U/L


 


ALT     (12-78)  U/L


 


Alkaline Phosphatase     ()  U/L


 


Troponin I     (0.000-0.056)  ng/mL


 


Total Protein     (6.4-8.2)  g/dL


 


Albumin     (3.4-5.0)  g/dL


 


Globulin     (2.3-3.5)  g/dL


 


Albumin/Globulin Ratio     (1.2-2.2)  


 


Urine Color    Yellow  (YELLOW)  


 


Urine Appearance    Cloudy A  (CLEAR)  


 


Urine pH    5.5  (5.0-8.0)  


 


Ur Specific Gravity    1.025  (1.008-1.030)  


 


Urine Protein    >=300 H  (NEGATIVE)  mg/dL


 


Urine Glucose (UA)    Negative  (NEGATIVE)  mg/dL


 


Urine Ketones    Negative  (NEGATIVE)  mg/dL


 


Urine Occult Blood    Negative  (NEGATIVE)  


 


Urine Nitrite    Negative  (NEGATIVE)  


 


Urine Bilirubin    Negative  (NEGATIVE)  


 


Urine Urobilinogen    0.2  (0.2-1.0)  EU/dL


 


Ur Leukocyte Esterase    Negative  (NEGATIVE)  


 


Urine RBC    0-5  (0-5)  


 


Urine WBC    0-5  (0-5)  


 


Ur Epithelial Cells    Few  


 


Amorphous Sediment    Packed  


 


Urine Bacteria    Not seen  


 


Urine Mucus    Not seen  


 


Urine Other    See note  


 


Urine Opiates Screen     (NEGATIVE)  


 


Ur Oxycodone Screen     (NEGATIVE)  


 


Urine Methadone Screen     (NEGATIVE)  


 


Ur Propoxyphene Screen     (NEGATIVE)  


 


Ur Barbiturates Screen     (NEGATIVE)  


 


Ur Tricyclics Screen     (NEGATIVE)  


 


Ur Phencyclidine Scrn     (NEGATIVE)  


 


Ur Amphetamine Screen     (NEGATIVE)  


 


U Methamphetamines Scrn     (NEGATIVE)  


 


Urine MDMA Screen     (NEGATIVE)  


 


U Benzodiazepines Scrn     (NEGATIVE)  


 


U Cocaine Metab Screen     (NEGATIVE)  


 


U Marijuana (THC) Screen     (NEGATIVE)  


 


SARS Virus RNA (PCR)   Negative   (NEGATIVE)  














  07/19/20 Range/Units





  15:01 


 


WBC   (4.5-11.0)  K/uL


 


RBC   (4.30-5.90)  M/uL


 


Hgb   (12.0-15.0)  g/dL


 


Hct   (40.0-54.0)  %


 


MCV   (80-98)  fL


 


MCH   (27-31)  pg


 


MCHC   (32-36)  %


 


Plt Count   (150-400)  K/uL


 


Neut % (Auto)   (36-66)  %


 


Lymph % (Auto)   (24-44)  %


 


Mono % (Auto)   (2-6)  %


 


Eos % (Auto)   (2-4)  %


 


Baso % (Auto)   (0-1)  %


 


Puncture Site   


 


ABG pH   (7.350-7.450)  


 


ABG pCO2   (35.0-42.0)  mmHg


 


ABG pO2   (75.0-100.0)  mmHg


 


ABG HCO3   (22.0-26.0)  mmol/L


 


ABG Total CO2   (23.0-27.0)  mmol/L


 


ABG O2 Saturation   (95.0-98.0)  %


 


ABG O2 Content   (15.0-23.0)  %vol


 


ABG Base Excess   mm/L


 


ABG Hemoglobin   (13.5-18.0)  g/dL


 


ABG Oxyhemoglobin   %


 


ABG Carboxyhemoglobin   (0.0-1.6)  %


 


ABG Methemoglobin   %


 


Stefano Test   


 


O2 Delivery Device   


 


Sodium   (140-148)  mmol/L


 


Potassium   (3.6-5.2)  mmol/L


 


Chloride   (100-108)  mmol/L


 


Carbon Dioxide   (21-32)  mmol/L


 


Anion Gap   (5.0-14.0)  mmol/L


 


BUN   (7-18)  mg/dL


 


Creatinine   (0.8-1.3)  mg/dL


 


Est Cr Clr Drug Dosing   


 


Estimated GFR (MDRD)   (>60)  


 


Glucose   ()  mg/dL


 


Lactic Acid   (0.4-2.0)  mmol/L


 


Calcium   (8.5-10.1)  mg/dL


 


Total Bilirubin   (0.2-1.0)  mg/dL


 


AST   (15-37)  U/L


 


ALT   (12-78)  U/L


 


Alkaline Phosphatase   ()  U/L


 


Troponin I   (0.000-0.056)  ng/mL


 


Total Protein   (6.4-8.2)  g/dL


 


Albumin   (3.4-5.0)  g/dL


 


Globulin   (2.3-3.5)  g/dL


 


Albumin/Globulin Ratio   (1.2-2.2)  


 


Urine Color   (YELLOW)  


 


Urine Appearance   (CLEAR)  


 


Urine pH   (5.0-8.0)  


 


Ur Specific Gravity   (1.008-1.030)  


 


Urine Protein   (NEGATIVE)  mg/dL


 


Urine Glucose (UA)   (NEGATIVE)  mg/dL


 


Urine Ketones   (NEGATIVE)  mg/dL


 


Urine Occult Blood   (NEGATIVE)  


 


Urine Nitrite   (NEGATIVE)  


 


Urine Bilirubin   (NEGATIVE)  


 


Urine Urobilinogen   (0.2-1.0)  EU/dL


 


Ur Leukocyte Esterase   (NEGATIVE)  


 


Urine RBC   (0-5)  


 


Urine WBC   (0-5)  


 


Ur Epithelial Cells   


 


Amorphous Sediment   


 


Urine Bacteria   


 


Urine Mucus   


 


Urine Other   


 


Urine Opiates Screen  Negative  (NEGATIVE)  


 


Ur Oxycodone Screen  Negative  (NEGATIVE)  


 


Urine Methadone Screen  Negative  (NEGATIVE)  


 


Ur Propoxyphene Screen  Negative  (NEGATIVE)  


 


Ur Barbiturates Screen  Negative  (NEGATIVE)  


 


Ur Tricyclics Screen  Presumptive positive H  (NEGATIVE)  


 


Ur Phencyclidine Scrn  Negative  (NEGATIVE)  


 


Ur Amphetamine Screen  Negative  (NEGATIVE)  


 


U Methamphetamines Scrn  Negative  (NEGATIVE)  


 


Urine MDMA Screen  Negative  (NEGATIVE)  


 


U Benzodiazepines Scrn  Negative  (NEGATIVE)  


 


U Cocaine Metab Screen  Negative  (NEGATIVE)  


 


U Marijuana (THC) Screen  Negative  (NEGATIVE)  


 


SARS Virus RNA (PCR)   (NEGATIVE)  











Meds: 


Medications











Generic Name Dose Route Start Last Admin





  Trade Name Freq  PRN Reason Stop Dose Admin


 


Acetaminophen  650 mg  07/19/20 15:47 





  Tylenol  PO  





  Q4H PRN  





  Pain (Mild 1-3)/fever  


 


Albuterol  2.5 mg  07/19/20 15:47 





  Proventil Neb Soln  NEB  





  Q4H PRN  





  Shortness Of Breath/wheezing  


 


Enoxaparin Sodium  30 mg  07/20/20 09:00 





  Lovenox  SUBCUT  





  DAILY [facility]  


 


Vancomycin HCl 2 gm/ Sodium  500 mls @ 250 mls/hr  07/19/20 14:30  07/19/20 

14:23





  Chloride  IV  07/19/20 16:29  250 mls/hr





  ONETIME ONE   Administration


 


Sodium Chloride  1,000 mls @ 700 mls/hr  07/19/20 15:00  07/19/20 15:13





  Normal Saline  IV  07/19/20 16:26  700 mls/hr





  ASDIRECTED [facility]   Administration


 


Sodium Chloride  1,000 mls @ 125 mls/hr  07/19/20 15:47  07/19/20 16:05





  Normal Saline  IV   125 mls/hr





  ASDIRECTED [facility]   Administration


 


Vancomycin HCl 2 gm/ Sodium  500 mls @ 250 mls/hr  07/20/20 15:00 





  Chloride  IV  





  Q24H [facility]  


 


Meropenem 500 mg/ Sodium  50 mls @ 100 mls/hr  07/20/20 02:00 





  Chloride  IV  





  Q12H [facility]  


 


Insulin Human Lispro  0 unit  07/19/20 18:00 





  Humalog  SUBCUT  





  Q6H [facility]  





  Protocol  


 


Lactobacillus Rhamnosus  1 cap  07/19/20 21:00 





  Culturelle  PO  





  BID [facility]  


 


Lorazepam  0.5 mg  07/19/20 15:47 





  Ativan  IVPUSH  





  Q4H PRN  





  Nausea/Vomiting  


 


Magnesium Hydroxide  30 ml  07/19/20 15:47 





  Milk Of Magnesia  PO  





  Q12H PRN  





  Constipation  


 


Ondansetron HCl  4 mg  07/19/20 15:47 





  Zofran  IV  





  Q6H PRN  





  Nausea/Vomiting  


 


Ondansetron HCl  4 mg  07/19/20 15:47 





  Zofran Odt  PO  





  Q6H PRN  





  Nausea able to take PO  


 


Pantoprazole Sodium  40 mg  07/20/20 07:30 





  Protonix***  PO  





  ACBREAKFAST [facility]  


 


Senna/Docusate Sodium  1 tab  07/19/20 15:47 





  Senna Plus  PO  





  BID PRN  





  Constipation  














Discontinued Medications














Generic Name Dose Route Start Last Admin





  Trade Name Freq  PRN Reason Stop Dose Admin


 


Sodium Chloride  1,000 mls @ 1,000 mls/hr  07/19/20 13:45  07/19/20 13:52





  Normal Saline  IV   1,000 mls/hr





  ASDIRECTED [facility]   Administration


 


Vancomycin HCl 1 gm/ Sodium  250 mls @ 150 mls/hr  07/19/20 13:42 





  Chloride  IV  07/19/20 15:21 





  ONETIME ONE  


 


Meropenem 1 gm/ Sodium  100 mls @ 200 mls/hr  07/19/20 14:30  07/19/20 14:22





  Chloride  IV  07/19/20 14:59  200 mls/hr





  ONETIME ONE   Administration














- Re-Assessments/Exams


Free Text/Narrative Re-Assessment/Exam: 





07/19/20 13:53


Normal saline bolus was started, blood cultures, CBC, CMP, troponin, lactic acid

 were obtained and the patient was sent back for head and chest CT.  When he 

returned a COVID-19 test was drawn.  My own review of the CT showed no acute 

bleed and the chest was actually clear other than cardiomegaly, official 

readings are pending.  Went in to talk to the patient and he is clearing, he is 

starting to answer questions now but still looks agitated and anxious.  1 g of 

meropenem along with 1 g of vancomycin were obtained after blood cultures were 

drawn.  Troponin returned negative, white count is now 20,000.


07/19/20 14:09


EKG is done and appears to be slow ventricular response to atrial fibrillation.


07/19/20 16:13


Head CT and chest CT are negative.  Working diagnosis is some type of sepsis 

syndrome, Dr. Bush of the hospitalist service agreed to admit the patient.





Departure





- Departure


Time of Disposition: 15:48


Disposition: Admitted As Inpatient 66


Clinical Impression: 


 Sepsis syndrome





Atrial fibrillation


Qualifiers:


 Atrial fibrillation type: paroxysmal Qualified Code(s): I48.0 - Paroxysmal 

atrial fibrillation





Change in mental status


Qualifiers:


 Altered mental status type: stupor Qualified Code(s): R40.1 - Stupor








- Discharge Information





Sepsis Event Note (ED)





- Focused Exam


Vital Signs: 


                                   Vital Signs











  Temp Pulse Resp BP Pulse Ox


 


 07/19/20 14:25   50 L  10 L  115/79  96


 


 07/19/20 13:55   48 L   110/83 


 


 07/19/20 13:25  97.1 F  48 L  10 L  97/73  98


 


 07/19/20 13:00  95.3 F L  51 L  10 L  112/70  93 L


 


 07/19/20 12:51  97.1 F  54 L  9 L  112/70  95














- My Orders


Last 24 Hours: 


My Active Orders





07/19/20 12:56


EKG 12 Lead [EK] Routine 





07/19/20 13:31


Blood Culture x2 Reflex Set [OM.PC] Urgent 





07/19/20 13:48


CULTURE BLOOD [BC] Urgent 





07/19/20 14:04


CULTURE BLOOD [BC] Urgent 





07/19/20 14:11


Urinary Catheter Assessment [RC] ASDIRECTED 





07/19/20 14:15


Insert Richards Catheter [Insert Urinary Catheter] [OM.PC] Q24H 














- Assessment/Plan


Last 24 Hours: 


My Active Orders





07/19/20 12:56


EKG 12 Lead [EK] Routine 





07/19/20 13:31


Blood Culture x2 Reflex Set [OM.PC] Urgent 





07/19/20 13:48


CULTURE BLOOD [BC] Urgent 





07/19/20 14:04


CULTURE BLOOD [BC] Urgent 





07/19/20 14:11


Urinary Catheter Assessment [RC] ASDIRECTED 





07/19/20 14:15


Insert Richards Catheter [Insert Urinary Catheter] [OM.PC] Q24H

## 2020-07-20 RX ADMIN — INSULIN LISPRO SCH: 100 INJECTION, SOLUTION INTRAVENOUS; SUBCUTANEOUS at 17:55

## 2020-07-20 RX ADMIN — LORAZEPAM PRN MG: 2 INJECTION INTRAMUSCULAR; INTRAVENOUS at 18:56

## 2020-07-20 RX ADMIN — NYSTATIN SCH APPLIC: 100000 POWDER TOPICAL at 20:27

## 2020-07-20 RX ADMIN — INSULIN LISPRO SCH UNIT: 100 INJECTION, SOLUTION INTRAVENOUS; SUBCUTANEOUS at 00:06

## 2020-07-20 RX ADMIN — MAGNESIUM SULFATE IN WATER SCH MLS/HR: 40 INJECTION, SOLUTION INTRAVENOUS at 17:58

## 2020-07-20 RX ADMIN — NYSTATIN SCH APPLIC: 100000 POWDER TOPICAL at 10:24

## 2020-07-20 RX ADMIN — INSULIN LISPRO SCH: 100 INJECTION, SOLUTION INTRAVENOUS; SUBCUTANEOUS at 12:11

## 2020-07-20 RX ADMIN — Medication SCH: at 17:55

## 2020-07-20 RX ADMIN — Medication SCH CAP: at 20:26

## 2020-07-20 RX ADMIN — MAGNESIUM SULFATE IN WATER SCH MLS/HR: 40 INJECTION, SOLUTION INTRAVENOUS at 12:55

## 2020-07-20 RX ADMIN — NYSTATIN SCH APPLIC: 100000 POWDER TOPICAL at 15:32

## 2020-07-20 RX ADMIN — INSULIN LISPRO SCH UNIT: 100 INJECTION, SOLUTION INTRAVENOUS; SUBCUTANEOUS at 05:50

## 2020-07-20 NOTE — PCM.PN
- General Info


Date of Service: 07/20/20


Subjective Update: 





Mr. Levine is a 65-year-old gentleman who was admitted through the emergency 

department with lethargy, weakness, fever and sepsis.  He did become more 

agitated last night received Haldol as well as lorazepam, this morning he is 

fairly sedated.  Evaluation for source of infection was unremarkable except for 

bilateral foot ulcerations.  He is lethargic and unable to provide meaningful 

history concerning recent symptoms or review of systems.





- Patient Data


Vitals - Most Recent: 


                                Last Vital Signs











Temp  98.4 F   07/20/20 11:00


 


Pulse  79   07/20/20 11:48


 


Resp  12   07/20/20 11:48


 


BP  154/94 H  07/20/20 11:48


 


Pulse Ox  95   07/20/20 11:48











Weight - Most Recent: 351 lb 9.6 oz


I&O - Last 24 Hours: 


                                 Intake & Output











 07/19/20 07/20/20 07/20/20





 22:59 06:59 14:59


 


Intake Total 2464 2457 


 


Output Total 460 170 525


 


Balance 2004 2287 -525











Lab Results Last 24 Hours: 


                         Laboratory Results - last 24 hr











  07/19/20 07/19/20 07/19/20 Range/Units





  12:52 13:00 13:00 


 


WBC   20.5 H   (4.5-11.0)  K/uL


 


RBC   4.77   (4.30-5.90)  M/uL


 


Hgb   13.8   (12.0-15.0)  g/dL


 


Hct   44.6   (40.0-54.0)  %


 


MCV   94   (80-98)  fL


 


MCH   29   (27-31)  pg


 


MCHC   31 L   (32-36)  %


 


Plt Count   341   (150-400)  K/uL


 


Neut % (Auto)   93 H   (36-66)  %


 


Lymph % (Auto)   3 L   (24-44)  %


 


Mono % (Auto)   4   (2-6)  %


 


Eos % (Auto)   0 L   (2-4)  %


 


Baso % (Auto)   0   (0-1)  %


 


PT     (9.5-12.0)  sec


 


INR     (0.80-1.20)  


 


Puncture Site  Rt brachial    


 


ABG pH  7.363    (7.350-7.450)  


 


ABG pCO2  40.4    (35.0-42.0)  mmHg


 


ABG pO2  101.0 H    (75.0-100.0)  mmHg


 


ABG HCO3  22.4    (22.0-26.0)  mmol/L


 


ABG Total CO2  20.0 L    (23.0-27.0)  mmol/L


 


ABG O2 Saturation  97.1    (95.0-98.0)  %


 


ABG O2 Content  18.6    (15.0-23.0)  %vol


 


ABG Base Excess  -2.3    mm/L


 


ABG Hemoglobin  13.8    (13.5-18.0)  g/dL


 


ABG Oxyhemoglobin  94.9    %


 


ABG Carboxyhemoglobin  1.6    (0.0-1.6)  %


 


ABG Methemoglobin  0.7    %


 


Stefano Test  N/a    


 


O2 Delivery Device  Room air    


 


Sodium    134 L  (140-148)  mmol/L


 


Potassium    5.5 H  (3.6-5.2)  mmol/L


 


Chloride    96 L  (100-108)  mmol/L


 


Carbon Dioxide    24  (21-32)  mmol/L


 


Anion Gap    19.5 H  (5.0-14.0)  mmol/L


 


BUN    22 H  (7-18)  mg/dL


 


Creatinine    2.7 H D  (0.8-1.3)  mg/dL


 


Est Cr Clr Drug Dosing    TNP  


 


Estimated GFR (MDRD)    24 L  (>60)  


 


Glucose    215 H  ()  mg/dL


 


Lactic Acid     (0.4-2.0)  mmol/L


 


Calcium    8.3 L  (8.5-10.1)  mg/dL


 


Magnesium     (1.8-2.4)  mg/dL


 


Total Bilirubin    1.1 H D  (0.2-1.0)  mg/dL


 


AST    62 H  (15-37)  U/L


 


ALT    45  (12-78)  U/L


 


Alkaline Phosphatase    179 H  ()  U/L


 


Troponin I    < 0.017  (0.000-0.056)  ng/mL


 


Total Protein    8.6 H  (6.4-8.2)  g/dL


 


Albumin    3.1 L  (3.4-5.0)  g/dL


 


Globulin    5.5 H  (2.3-3.5)  g/dL


 


Albumin/Globulin Ratio    0.6 L  (1.2-2.2)  


 


Urine Color     (YELLOW)  


 


Urine Appearance     (CLEAR)  


 


Urine pH     (5.0-8.0)  


 


Ur Specific Gravity     (1.008-1.030)  


 


Urine Protein     (NEGATIVE)  mg/dL


 


Urine Glucose (UA)     (NEGATIVE)  mg/dL


 


Urine Ketones     (NEGATIVE)  mg/dL


 


Urine Occult Blood     (NEGATIVE)  


 


Urine Nitrite     (NEGATIVE)  


 


Urine Bilirubin     (NEGATIVE)  


 


Urine Urobilinogen     (0.2-1.0)  EU/dL


 


Ur Leukocyte Esterase     (NEGATIVE)  


 


Urine RBC     (0-5)  


 


Urine WBC     (0-5)  


 


Ur Epithelial Cells     


 


Amorphous Sediment     


 


Urine Bacteria     


 


Urine Mucus     


 


Urine Other     


 


Urine Opiates Screen     (NEGATIVE)  


 


Ur Oxycodone Screen     (NEGATIVE)  


 


Urine Methadone Screen     (NEGATIVE)  


 


Ur Propoxyphene Screen     (NEGATIVE)  


 


Ur Barbiturates Screen     (NEGATIVE)  


 


Ur Tricyclics Screen     (NEGATIVE)  


 


Ur Phencyclidine Scrn     (NEGATIVE)  


 


Ur Amphetamine Screen     (NEGATIVE)  


 


U Methamphetamines Scrn     (NEGATIVE)  


 


Urine MDMA Screen     (NEGATIVE)  


 


U Benzodiazepines Scrn     (NEGATIVE)  


 


U Cocaine Metab Screen     (NEGATIVE)  


 


U Marijuana (THC) Screen     (NEGATIVE)  


 


SARS Virus RNA (PCR)     (NEGATIVE)  














  07/19/20 07/19/20 07/19/20 Range/Units





  13:00 14:28 14:46 


 


WBC     (4.5-11.0)  K/uL


 


RBC     (4.30-5.90)  M/uL


 


Hgb     (12.0-15.0)  g/dL


 


Hct     (40.0-54.0)  %


 


MCV     (80-98)  fL


 


MCH     (27-31)  pg


 


MCHC     (32-36)  %


 


Plt Count     (150-400)  K/uL


 


Neut % (Auto)     (36-66)  %


 


Lymph % (Auto)     (24-44)  %


 


Mono % (Auto)     (2-6)  %


 


Eos % (Auto)     (2-4)  %


 


Baso % (Auto)     (0-1)  %


 


PT     (9.5-12.0)  sec


 


INR     (0.80-1.20)  


 


Puncture Site     


 


ABG pH     (7.350-7.450)  


 


ABG pCO2     (35.0-42.0)  mmHg


 


ABG pO2     (75.0-100.0)  mmHg


 


ABG HCO3     (22.0-26.0)  mmol/L


 


ABG Total CO2     (23.0-27.0)  mmol/L


 


ABG O2 Saturation     (95.0-98.0)  %


 


ABG O2 Content     (15.0-23.0)  %vol


 


ABG Base Excess     mm/L


 


ABG Hemoglobin     (13.5-18.0)  g/dL


 


ABG Oxyhemoglobin     %


 


ABG Carboxyhemoglobin     (0.0-1.6)  %


 


ABG Methemoglobin     %


 


Stefano Test     


 


O2 Delivery Device     


 


Sodium     (140-148)  mmol/L


 


Potassium     (3.6-5.2)  mmol/L


 


Chloride     (100-108)  mmol/L


 


Carbon Dioxide     (21-32)  mmol/L


 


Anion Gap     (5.0-14.0)  mmol/L


 


BUN     (7-18)  mg/dL


 


Creatinine     (0.8-1.3)  mg/dL


 


Est Cr Clr Drug Dosing     


 


Estimated GFR (MDRD)     (>60)  


 


Glucose     ()  mg/dL


 


Lactic Acid  4.6 H    (0.4-2.0)  mmol/L


 


Calcium     (8.5-10.1)  mg/dL


 


Magnesium     (1.8-2.4)  mg/dL


 


Total Bilirubin     (0.2-1.0)  mg/dL


 


AST     (15-37)  U/L


 


ALT     (12-78)  U/L


 


Alkaline Phosphatase     ()  U/L


 


Troponin I     (0.000-0.056)  ng/mL


 


Total Protein     (6.4-8.2)  g/dL


 


Albumin     (3.4-5.0)  g/dL


 


Globulin     (2.3-3.5)  g/dL


 


Albumin/Globulin Ratio     (1.2-2.2)  


 


Urine Color    Yellow  (YELLOW)  


 


Urine Appearance    Cloudy A  (CLEAR)  


 


Urine pH    5.5  (5.0-8.0)  


 


Ur Specific Gravity    1.025  (1.008-1.030)  


 


Urine Protein    >=300 H  (NEGATIVE)  mg/dL


 


Urine Glucose (UA)    Negative  (NEGATIVE)  mg/dL


 


Urine Ketones    Negative  (NEGATIVE)  mg/dL


 


Urine Occult Blood    Negative  (NEGATIVE)  


 


Urine Nitrite    Negative  (NEGATIVE)  


 


Urine Bilirubin    Negative  (NEGATIVE)  


 


Urine Urobilinogen    0.2  (0.2-1.0)  EU/dL


 


Ur Leukocyte Esterase    Negative  (NEGATIVE)  


 


Urine RBC    0-5  (0-5)  


 


Urine WBC    0-5  (0-5)  


 


Ur Epithelial Cells    Few  


 


Amorphous Sediment    Packed  


 


Urine Bacteria    Not seen  


 


Urine Mucus    Not seen  


 


Urine Other    See note  


 


Urine Opiates Screen     (NEGATIVE)  


 


Ur Oxycodone Screen     (NEGATIVE)  


 


Urine Methadone Screen     (NEGATIVE)  


 


Ur Propoxyphene Screen     (NEGATIVE)  


 


Ur Barbiturates Screen     (NEGATIVE)  


 


Ur Tricyclics Screen     (NEGATIVE)  


 


Ur Phencyclidine Scrn     (NEGATIVE)  


 


Ur Amphetamine Screen     (NEGATIVE)  


 


U Methamphetamines Scrn     (NEGATIVE)  


 


Urine MDMA Screen     (NEGATIVE)  


 


U Benzodiazepines Scrn     (NEGATIVE)  


 


U Cocaine Metab Screen     (NEGATIVE)  


 


U Marijuana (THC) Screen     (NEGATIVE)  


 


SARS Virus RNA (PCR)   Negative   (NEGATIVE)  














  07/19/20 07/19/20 07/19/20 Range/Units





  15:01 18:00 22:00 


 


WBC     (4.5-11.0)  K/uL


 


RBC     (4.30-5.90)  M/uL


 


Hgb     (12.0-15.0)  g/dL


 


Hct     (40.0-54.0)  %


 


MCV     (80-98)  fL


 


MCH     (27-31)  pg


 


MCHC     (32-36)  %


 


Plt Count     (150-400)  K/uL


 


Neut % (Auto)     (36-66)  %


 


Lymph % (Auto)     (24-44)  %


 


Mono % (Auto)     (2-6)  %


 


Eos % (Auto)     (2-4)  %


 


Baso % (Auto)     (0-1)  %


 


PT     (9.5-12.0)  sec


 


INR     (0.80-1.20)  


 


Puncture Site     


 


ABG pH     (7.350-7.450)  


 


ABG pCO2     (35.0-42.0)  mmHg


 


ABG pO2     (75.0-100.0)  mmHg


 


ABG HCO3     (22.0-26.0)  mmol/L


 


ABG Total CO2     (23.0-27.0)  mmol/L


 


ABG O2 Saturation     (95.0-98.0)  %


 


ABG O2 Content     (15.0-23.0)  %vol


 


ABG Base Excess     mm/L


 


ABG Hemoglobin     (13.5-18.0)  g/dL


 


ABG Oxyhemoglobin     %


 


ABG Carboxyhemoglobin     (0.0-1.6)  %


 


ABG Methemoglobin     %


 


Stefano Test     


 


O2 Delivery Device     


 


Sodium     (140-148)  mmol/L


 


Potassium     (3.6-5.2)  mmol/L


 


Chloride     (100-108)  mmol/L


 


Carbon Dioxide     (21-32)  mmol/L


 


Anion Gap     (5.0-14.0)  mmol/L


 


BUN     (7-18)  mg/dL


 


Creatinine     (0.8-1.3)  mg/dL


 


Est Cr Clr Drug Dosing     


 


Estimated GFR (MDRD)     (>60)  


 


Glucose     ()  mg/dL


 


Lactic Acid   3.4 H  2.7 H  (0.4-2.0)  mmol/L


 


Calcium     (8.5-10.1)  mg/dL


 


Magnesium     (1.8-2.4)  mg/dL


 


Total Bilirubin     (0.2-1.0)  mg/dL


 


AST     (15-37)  U/L


 


ALT     (12-78)  U/L


 


Alkaline Phosphatase     ()  U/L


 


Troponin I     (0.000-0.056)  ng/mL


 


Total Protein     (6.4-8.2)  g/dL


 


Albumin     (3.4-5.0)  g/dL


 


Globulin     (2.3-3.5)  g/dL


 


Albumin/Globulin Ratio     (1.2-2.2)  


 


Urine Color     (YELLOW)  


 


Urine Appearance     (CLEAR)  


 


Urine pH     (5.0-8.0)  


 


Ur Specific Gravity     (1.008-1.030)  


 


Urine Protein     (NEGATIVE)  mg/dL


 


Urine Glucose (UA)     (NEGATIVE)  mg/dL


 


Urine Ketones     (NEGATIVE)  mg/dL


 


Urine Occult Blood     (NEGATIVE)  


 


Urine Nitrite     (NEGATIVE)  


 


Urine Bilirubin     (NEGATIVE)  


 


Urine Urobilinogen     (0.2-1.0)  EU/dL


 


Ur Leukocyte Esterase     (NEGATIVE)  


 


Urine RBC     (0-5)  


 


Urine WBC     (0-5)  


 


Ur Epithelial Cells     


 


Amorphous Sediment     


 


Urine Bacteria     


 


Urine Mucus     


 


Urine Other     


 


Urine Opiates Screen  Negative    (NEGATIVE)  


 


Ur Oxycodone Screen  Negative    (NEGATIVE)  


 


Urine Methadone Screen  Negative    (NEGATIVE)  


 


Ur Propoxyphene Screen  Negative    (NEGATIVE)  


 


Ur Barbiturates Screen  Negative    (NEGATIVE)  


 


Ur Tricyclics Screen  Presumptive positive H    (NEGATIVE)  


 


Ur Phencyclidine Scrn  Negative    (NEGATIVE)  


 


Ur Amphetamine Screen  Negative    (NEGATIVE)  


 


U Methamphetamines Scrn  Negative    (NEGATIVE)  


 


Urine MDMA Screen  Negative    (NEGATIVE)  


 


U Benzodiazepines Scrn  Negative    (NEGATIVE)  


 


U Cocaine Metab Screen  Negative    (NEGATIVE)  


 


U Marijuana (THC) Screen  Negative    (NEGATIVE)  


 


SARS Virus RNA (PCR)     (NEGATIVE)  














  07/20/20 07/20/20 07/20/20 Range/Units





  02:20 05:50 05:50 


 


WBC   16.6 H   (4.5-11.0)  K/uL


 


RBC   4.24 L   (4.30-5.90)  M/uL


 


Hgb   12.2   (12.0-15.0)  g/dL


 


Hct   39.7 L   (40.0-54.0)  %


 


MCV   94   (80-98)  fL


 


MCH   29   (27-31)  pg


 


MCHC   31 L   (32-36)  %


 


Plt Count   279   (150-400)  K/uL


 


Neut % (Auto)     (36-66)  %


 


Lymph % (Auto)     (24-44)  %


 


Mono % (Auto)     (2-6)  %


 


Eos % (Auto)     (2-4)  %


 


Baso % (Auto)     (0-1)  %


 


PT     (9.5-12.0)  sec


 


INR     (0.80-1.20)  


 


Puncture Site     


 


ABG pH     (7.350-7.450)  


 


ABG pCO2     (35.0-42.0)  mmHg


 


ABG pO2     (75.0-100.0)  mmHg


 


ABG HCO3     (22.0-26.0)  mmol/L


 


ABG Total CO2     (23.0-27.0)  mmol/L


 


ABG O2 Saturation     (95.0-98.0)  %


 


ABG O2 Content     (15.0-23.0)  %vol


 


ABG Base Excess     mm/L


 


ABG Hemoglobin     (13.5-18.0)  g/dL


 


ABG Oxyhemoglobin     %


 


ABG Carboxyhemoglobin     (0.0-1.6)  %


 


ABG Methemoglobin     %


 


Stefano Test     


 


O2 Delivery Device     


 


Sodium    138 L  (140-148)  mmol/L


 


Potassium    4.9  (3.6-5.2)  mmol/L


 


Chloride    102  (100-108)  mmol/L


 


Carbon Dioxide    25  (21-32)  mmol/L


 


Anion Gap    15.9 H  (5.0-14.0)  mmol/L


 


BUN    25 H  (7-18)  mg/dL


 


Creatinine    2.7 H  (0.8-1.3)  mg/dL


 


Est Cr Clr Drug Dosing    25.44  


 


Estimated GFR (MDRD)    24 L  (>60)  


 


Glucose    152 H  ()  mg/dL


 


Lactic Acid  1.9    (0.4-2.0)  mmol/L


 


Calcium    7.6 L  (8.5-10.1)  mg/dL


 


Magnesium    1.4 L  (1.8-2.4)  mg/dL


 


Total Bilirubin    0.8  (0.2-1.0)  mg/dL


 


AST    43 H  (15-37)  U/L


 


ALT    36  (12-78)  U/L


 


Alkaline Phosphatase    150 H  ()  U/L


 


Troponin I     (0.000-0.056)  ng/mL


 


Total Protein    7.4  (6.4-8.2)  g/dL


 


Albumin    2.6 L  (3.4-5.0)  g/dL


 


Globulin    4.8 H  (2.3-3.5)  g/dL


 


Albumin/Globulin Ratio    0.5 L  (1.2-2.2)  


 


Urine Color     (YELLOW)  


 


Urine Appearance     (CLEAR)  


 


Urine pH     (5.0-8.0)  


 


Ur Specific Gravity     (1.008-1.030)  


 


Urine Protein     (NEGATIVE)  mg/dL


 


Urine Glucose (UA)     (NEGATIVE)  mg/dL


 


Urine Ketones     (NEGATIVE)  mg/dL


 


Urine Occult Blood     (NEGATIVE)  


 


Urine Nitrite     (NEGATIVE)  


 


Urine Bilirubin     (NEGATIVE)  


 


Urine Urobilinogen     (0.2-1.0)  EU/dL


 


Ur Leukocyte Esterase     (NEGATIVE)  


 


Urine RBC     (0-5)  


 


Urine WBC     (0-5)  


 


Ur Epithelial Cells     


 


Amorphous Sediment     


 


Urine Bacteria     


 


Urine Mucus     


 


Urine Other     


 


Urine Opiates Screen     (NEGATIVE)  


 


Ur Oxycodone Screen     (NEGATIVE)  


 


Urine Methadone Screen     (NEGATIVE)  


 


Ur Propoxyphene Screen     (NEGATIVE)  


 


Ur Barbiturates Screen     (NEGATIVE)  


 


Ur Tricyclics Screen     (NEGATIVE)  


 


Ur Phencyclidine Scrn     (NEGATIVE)  


 


Ur Amphetamine Screen     (NEGATIVE)  


 


U Methamphetamines Scrn     (NEGATIVE)  


 


Urine MDMA Screen     (NEGATIVE)  


 


U Benzodiazepines Scrn     (NEGATIVE)  


 


U Cocaine Metab Screen     (NEGATIVE)  


 


U Marijuana (THC) Screen     (NEGATIVE)  


 


SARS Virus RNA (PCR)     (NEGATIVE)  














  07/20/20 07/20/20 Range/Units





  05:50 05:50 


 


WBC    (4.5-11.0)  K/uL


 


RBC    (4.30-5.90)  M/uL


 


Hgb    (12.0-15.0)  g/dL


 


Hct    (40.0-54.0)  %


 


MCV    (80-98)  fL


 


MCH    (27-31)  pg


 


MCHC    (32-36)  %


 


Plt Count    (150-400)  K/uL


 


Neut % (Auto)    (36-66)  %


 


Lymph % (Auto)    (24-44)  %


 


Mono % (Auto)    (2-6)  %


 


Eos % (Auto)    (2-4)  %


 


Baso % (Auto)    (0-1)  %


 


PT   16.8 H  (9.5-12.0)  sec


 


INR   1.55 H  (0.80-1.20)  


 


Puncture Site    


 


ABG pH    (7.350-7.450)  


 


ABG pCO2    (35.0-42.0)  mmHg


 


ABG pO2    (75.0-100.0)  mmHg


 


ABG HCO3    (22.0-26.0)  mmol/L


 


ABG Total CO2    (23.0-27.0)  mmol/L


 


ABG O2 Saturation    (95.0-98.0)  %


 


ABG O2 Content    (15.0-23.0)  %vol


 


ABG Base Excess    mm/L


 


ABG Hemoglobin    (13.5-18.0)  g/dL


 


ABG Oxyhemoglobin    %


 


ABG Carboxyhemoglobin    (0.0-1.6)  %


 


ABG Methemoglobin    %


 


Stefano Test    


 


O2 Delivery Device    


 


Sodium    (140-148)  mmol/L


 


Potassium    (3.6-5.2)  mmol/L


 


Chloride    (100-108)  mmol/L


 


Carbon Dioxide    (21-32)  mmol/L


 


Anion Gap    (5.0-14.0)  mmol/L


 


BUN    (7-18)  mg/dL


 


Creatinine    (0.8-1.3)  mg/dL


 


Est Cr Clr Drug Dosing    


 


Estimated GFR (MDRD)    (>60)  


 


Glucose    ()  mg/dL


 


Lactic Acid  1.6   (0.4-2.0)  mmol/L


 


Calcium    (8.5-10.1)  mg/dL


 


Magnesium    (1.8-2.4)  mg/dL


 


Total Bilirubin    (0.2-1.0)  mg/dL


 


AST    (15-37)  U/L


 


ALT    (12-78)  U/L


 


Alkaline Phosphatase    ()  U/L


 


Troponin I    (0.000-0.056)  ng/mL


 


Total Protein    (6.4-8.2)  g/dL


 


Albumin    (3.4-5.0)  g/dL


 


Globulin    (2.3-3.5)  g/dL


 


Albumin/Globulin Ratio    (1.2-2.2)  


 


Urine Color    (YELLOW)  


 


Urine Appearance    (CLEAR)  


 


Urine pH    (5.0-8.0)  


 


Ur Specific Gravity    (1.008-1.030)  


 


Urine Protein    (NEGATIVE)  mg/dL


 


Urine Glucose (UA)    (NEGATIVE)  mg/dL


 


Urine Ketones    (NEGATIVE)  mg/dL


 


Urine Occult Blood    (NEGATIVE)  


 


Urine Nitrite    (NEGATIVE)  


 


Urine Bilirubin    (NEGATIVE)  


 


Urine Urobilinogen    (0.2-1.0)  EU/dL


 


Ur Leukocyte Esterase    (NEGATIVE)  


 


Urine RBC    (0-5)  


 


Urine WBC    (0-5)  


 


Ur Epithelial Cells    


 


Amorphous Sediment    


 


Urine Bacteria    


 


Urine Mucus    


 


Urine Other    


 


Urine Opiates Screen    (NEGATIVE)  


 


Ur Oxycodone Screen    (NEGATIVE)  


 


Urine Methadone Screen    (NEGATIVE)  


 


Ur Propoxyphene Screen    (NEGATIVE)  


 


Ur Barbiturates Screen    (NEGATIVE)  


 


Ur Tricyclics Screen    (NEGATIVE)  


 


Ur Phencyclidine Scrn    (NEGATIVE)  


 


Ur Amphetamine Screen    (NEGATIVE)  


 


U Methamphetamines Scrn    (NEGATIVE)  


 


Urine MDMA Screen    (NEGATIVE)  


 


U Benzodiazepines Scrn    (NEGATIVE)  


 


U Cocaine Metab Screen    (NEGATIVE)  


 


U Marijuana (THC) Screen    (NEGATIVE)  


 


SARS Virus RNA (PCR)    (NEGATIVE)  











Med Orders - Current: 


                               Current Medications





Acetaminophen (Tylenol)  650 mg PO Q4H PRN


   PRN Reason: Pain (Mild 1-3)/fever


Albuterol (Proventil Neb Soln)  2.5 mg NEB Q4H PRN


   PRN Reason: Shortness Of Breath/wheezing


Enoxaparin Sodium (Lovenox)  30 mg SUBCUT DAILY [facility]


   Last Admin: 07/20/20 10:24 Dose:  30 mg


   Documented by: 


Sodium Chloride (Normal Saline)  1,000 mls @ 125 mls/hr IV ASDIRECTED [facility]


   Last Admin: 07/20/20 11:41 Dose:  125 mls/hr


   Documented by: 


Vancomycin HCl 2 gm/ Sodium (Chloride)  500 mls @ 250 mls/hr IV Q24H [facility]


Meropenem 500 mg/ Sodium (Chloride)  50 mls @ 100 mls/hr IV Q12H [facility]


   Last Admin: 07/20/20 01:56 Dose:  100 mls/hr


   Documented by: 


Magnesium Sulfate 2 gm/ Premix  50 mls @ 25 mls/hr IV Q6H [facility]


   Stop: 07/21/20 01:59


Insulin Human Lispro (Humalog)  0 unit SUBCUT Q6H [facility]; Protocol


   Last Admin: 07/20/20 05:50 Dose:  1 unit


   Documented by: 


Lactobacillus Rhamnosus (Culturelle)  1 cap PO BID [facility]


   Last Admin: 07/19/20 20:42 Dose:  Not Given


   Documented by: 


Lorazepam (Ativan)  0.5 mg IVPUSH Q4H PRN


   PRN Reason: Nausea/Vomiting


   Last Admin: 07/19/20 20:30 Dose:  2 mg


   Documented by: 


Magnesium Hydroxide (Milk Of Magnesia)  30 ml PO Q12H PRN


   PRN Reason: Constipation


Magnesium Oxide (Magnesium Oxide)  400 mg PO BID [facility]


Nystatin (Nystop)  0 gm TOP TID [facility]


   Last Admin: 07/20/20 10:24 Dose:  1 applic


   Documented by: 


Ondansetron HCl (Zofran)  4 mg IV Q6H PRN


   PRN Reason: Nausea/Vomiting


   Last Admin: 07/19/20 18:08 Dose:  4 mg


   Documented by: 


Ondansetron HCl (Zofran Odt)  4 mg PO Q6H PRN


   PRN Reason: Nausea able to take PO


Pantoprazole Sodium (Protonix***)  40 mg PO ACBREAKFAST [facility]


Senna/Docusate Sodium (Senna Plus)  1 tab PO BID PRN


   PRN Reason: Constipation


Warfarin Sodium (Coumadin)  2.5 mg PO DAILY@1300 [facility]





Discontinued Medications





Haloperidol Lactate (Haldol) Confirm Administered Dose 10 mg .ROUTE .STK-MED ONE


   Stop: 07/19/20 20:17


   Last Admin: 07/19/20 20:30 Dose:  10 mg


   Documented by: 


Haloperidol Lactate (Haldol)  10 mg IVPUSH ONETIME STA


   Stop: 07/19/20 20:19


   Last Admin: 07/19/20 20:42 Dose:  Not Given


   Documented by: 


Sodium Chloride (Normal Saline)  1,000 mls @ 1,000 mls/hr IV ASDIRECTED [facility]


   Last Admin: 07/19/20 13:52 Dose:  1,000 mls/hr


   Documented by: 


Vancomycin HCl 1 gm/ Sodium (Chloride)  250 mls @ 150 mls/hr IV ONETIME ONE


   Stop: 07/19/20 15:21


   Last Admin: 07/19/20 16:43 Dose:  Not Given


   Documented by: 


Vancomycin HCl 2 gm/ Sodium (Chloride)  500 mls @ 250 mls/hr IV ONETIME ONE


   Stop: 07/19/20 16:29


   Last Admin: 07/19/20 14:23 Dose:  250 mls/hr


   Documented by: 


Meropenem 1 gm/ Sodium (Chloride)  100 mls @ 200 mls/hr IV ONETIME ONE


   Stop: 07/19/20 14:59


   Last Admin: 07/19/20 14:22 Dose:  200 mls/hr


   Documented by: 


Sodium Chloride (Normal Saline)  1,000 mls @ 700 mls/hr IV ASDIRECTED [facility]


   Stop: 07/19/20 16:26


   Last Admin: 07/19/20 15:13 Dose:  700 mls/hr


   Documented by: 


Sodium Chloride (Normal Saline)  500 mls @ 500 mls/hr IV .BOLUS ONE


   Stop: 07/19/20 22:01


   Last Admin: 07/19/20 21:15 Dose:  500 mls/hr


   Documented by: 


Sodium Chloride (Normal Saline)  1,000 mls @ 500 mls/hr IV STAT STA


   Stop: 07/20/20 00:12


   Last Admin: 07/19/20 22:15 Dose:  500 mls/hr


   Documented by: 


Nystatin (Nystop)  1 gm TOP TID [facility]


   Last Admin: 07/19/20 21:45 Dose:  1 applic


   Documented by: 











- Exam


General: Sedated, Lethargic


Lungs: Clear to Auscultation, Normal Respiratory Effort


Cardiovascular: Regular Rate, Regular Rhythm, No Murmurs


GI/Abdominal Exam: Soft, Non-Tender, No Organomegaly, No Distention


Extremities: Other (Ulceration medial aspect of his left heel and medial aspect 

of the right first MTP joint.)





Sepsis Event Note





- Evaluation


Sepsis Screening Result: No Definite Risk





- Focused Exam


Vital Signs: 


                                   Vital Signs











  Temp Pulse Resp BP Pulse Ox


 


 07/20/20 11:48   79  12  154/94 H  95


 


 07/20/20 11:00  98.4 F  83  11 L  161/88 H  97


 


 07/20/20 10:00   78  10 L   96


 


 07/20/20 09:00   70  7 L  137/63  97


 


 07/20/20 08:00  98 F  76  9 L  124/54 L  96


 


 07/20/20 07:00   70  12  153/121 H  96


 


 07/20/20 06:00   69  11 L  143/80 H  97


 


 07/20/20 05:00   67  8 L  143/80 H  96


 


 07/20/20 04:00   65  8 L  135/58 L  95


 


 07/20/20 03:00  98.3 F  70  7 L  121/36 L  98


 


 07/20/20 02:00  97.9 F  70  14  120/97 H  96


 


 07/20/20 00:59  97.8 F  59 L  11 L  115/64  100











Date Exam was Performed: 07/20/20


Time Exam was Performed: 12:05





- Problem List Review


Problem List Initiated/Reviewed/Updated: Yes





- My Orders


Last 24 Hours: 


My Active Orders





07/20/20 09:57


Foot wo Cont Lt [MR] Urgent 


Foot wo Cont Rt [MR] Urgent 





07/20/20 10:00


Consult to Physician [CONS] Urgent 





07/20/20 10:04


Notify Provider Consults [RC] ASDIRECTED 





07/20/20 11:58


Foot wo Cont Lt [CT] Routine 


Foot wo Cont Rt [CT] Routine 





07/20/20 12:00


Magnesium Oxide   400 mg PO BID 


Magnesium Sulfate/Water [Magnesium Sulfate in Water Premix] 2 gm   Premix Bag 1 

bag IV Q6H 





07/20/20 13:00


Warfarin [Coumadin]   2.5 mg PO DAILY@1300 





07/21/20 05:00


CBC WITH AUTO DIFF [HEME] Timed 


COMPREHENSIVE METABOLIC PN,CMP [CHEM] Timed 


MAGNESIUM [CHEM] Timed 





07/21/20 05:11


INR,PT,PROTHROMBIN TIME [COAG] AM 














- Plan


Plan:: 





ASSESSMENT AND PLAN - 





Sepsis-secondary to ulcerations on the feet, he has significant leukocytosis and

 lactic acid was greater than 4. Urine was clear.  Chest was clear.  Head CT 

unremarkable.  Examination otherwise benign.  Cultures have been obtained and 

broad-spectrum antibiotics administered.  X-ray of the left foot shows no 

obvious osteomyelitis


-CT scan of both feet, MRI not obtainable because of patient's size


-Continue vancomycin and meropenem


-Follow-up cultures





Acute kidney injury-creatinine significantly elevated from baseline in the 

setting of sepsis.  Creatinine is stable from admission with no improvement 

noted thus far


-Management as above with labs in the morning





Hyperkalemia-resolved


-Repeat level in the morning





History of congestive heart failure-compensated at this time but at high risk 

for decompensation if too aggressive of a volume resuscitation is undertaken.


-Restart beta-blocker and additional management as able/needed





Atrial fibrillation-unclear duration, currently rate controlled.  INR is 

subtherapeutic


-Hold beta-blocker and calcium channel blocker at this time


-Warfarin 2.5 mg p.o. daily


-Recheck INR in a.m.





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  

Recent hemoglobin A1c was 7.


-Hold metformin


-Low-dose sliding scale





Morbid obesity with BMI greater than 50





Maintenance issues - 


- DVT prophylaxis -enoxaparin


- GI prophylaxis -PPI


- Nutrition -nothing by mouth until he wakes up


- Richards catheter -placed in the emergency room for strict intake and output 

monitoring in a critical patient





CODE STATUS -DNR/DNI per advanced directive





Admission justification -this patient will be admitted for inpatient services 

and is medically appropriate meeting medical necessity for inpatient admission 

as outlined in my documentation.  I reasonably expect the patient will require 

inpatient services that span a period time over 2 midnights. I reasonably expect

 this patient to be discharged or transferred within 96 hours [facility]





Disposition -I would anticipate discharge home after the hospital stay





Primary care physician - Sven Mills NP

## 2020-07-20 NOTE — CR
FOOT RIGHT 3 views

 

CLINICAL HISTORY:Evaluate for osteomyelitis ulcer

 

FINDINGS:There is moderate diffuse soft tissue swelling of the forefoot and

toes. There is severe osteoarthritic change in the first MTP joint with moderate

changes in the interphalangeal joints. No kath bone destruction is identified.

 

Impression: SOFT tissue swelling

 

Severe osteoarthritis at the first MTP joint.

 

No bony destruction

## 2020-07-21 RX ADMIN — INSULIN LISPRO SCH UNIT: 100 INJECTION, SOLUTION INTRAVENOUS; SUBCUTANEOUS at 17:51

## 2020-07-21 RX ADMIN — Medication SCH CAP: at 09:38

## 2020-07-21 RX ADMIN — MAGNESIUM SULFATE IN WATER SCH MLS/HR: 40 INJECTION, SOLUTION INTRAVENOUS at 00:27

## 2020-07-21 RX ADMIN — NYSTATIN SCH APPLIC: 100000 POWDER TOPICAL at 20:25

## 2020-07-21 RX ADMIN — DILTIAZEM HYDROCHLORIDE SCH MG: 180 CAPSULE, COATED, EXTENDED RELEASE ORAL at 12:40

## 2020-07-21 RX ADMIN — INSULIN LISPRO SCH UNIT: 100 INJECTION, SOLUTION INTRAVENOUS; SUBCUTANEOUS at 21:20

## 2020-07-21 RX ADMIN — INSULIN LISPRO SCH: 100 INJECTION, SOLUTION INTRAVENOUS; SUBCUTANEOUS at 05:51

## 2020-07-21 RX ADMIN — INSULIN LISPRO SCH: 100 INJECTION, SOLUTION INTRAVENOUS; SUBCUTANEOUS at 12:32

## 2020-07-21 RX ADMIN — Medication SCH CAP: at 20:24

## 2020-07-21 RX ADMIN — INSULIN LISPRO SCH: 100 INJECTION, SOLUTION INTRAVENOUS; SUBCUTANEOUS at 00:26

## 2020-07-21 RX ADMIN — FLUTICASONE PROPIONATE AND SALMETEROL SCH PUFF: 232; 14 POWDER, METERED RESPIRATORY (INHALATION) at 20:24

## 2020-07-21 RX ADMIN — NYSTATIN SCH APPLIC: 100000 POWDER TOPICAL at 09:39

## 2020-07-21 RX ADMIN — NYSTATIN SCH APPLIC: 100000 POWDER TOPICAL at 15:40

## 2020-07-21 NOTE — PCM.PN
- General Info


Date of Service: 07/21/20


Subjective Update: 





Mr. Levine is improved since yesterday with no significant temperature elevation 

and stable vital signs.  White blood cell count is normalized and renal function

is almost back to baseline.  He is more alert and interactive.





- Review of Systems


General: Reports: Weakness, Fatigue.  Denies: Fever, Chills


Pulmonary: Reports: No Symptoms


Cardiovascular: Reports: No Symptoms


Gastrointestinal: Reports: No Symptoms





- Patient Data


Vitals - Most Recent: 


                                Last Vital Signs











Temp  98.4 F   07/21/20 09:00


 


Pulse  121 H  07/21/20 11:00


 


Resp  13   07/21/20 11:00


 


BP  158/95 H  07/21/20 11:00


 


Pulse Ox  99   07/21/20 11:00











Weight - Most Recent: 351 lb 9.6 oz


I&O - Last 24 Hours: 


                                 Intake & Output











 07/20/20 07/21/20 07/21/20





 22:59 06:59 14:59


 


Intake Total 1857 1475 


 


Output Total 3260 1950 1450


 


Balance -[facility] -[address] [place], [place] [address]











Lab Results Last 24 Hours: 


                         Laboratory Results - last 24 hr











  07/21/20 07/21/20 07/21/20 Range/Units





  05:40 05:40 05:40 


 


WBC   6.5   (4.5-11.0)  K/uL


 


RBC   4.16 L   (4.30-5.90)  M/uL


 


Hgb   12.2   (12.0-15.0)  g/dL


 


Hct   38.8 L   (40.0-54.0)  %


 


MCV   93   (80-98)  fL


 


MCH   29   (27-31)  pg


 


MCHC   31 L   (32-36)  %


 


Plt Count   173   (150-400)  K/uL


 


Neut % (Auto)   73 H   (36-66)  %


 


Lymph % (Auto)   12 L   (24-44)  %


 


Mono % (Auto)   14 H   (2-6)  %


 


Eos % (Auto)   1 L   (2-4)  %


 


Baso % (Auto)   1   (0-1)  %


 


PT  14.1 H    (9.5-12.0)  sec


 


INR  1.30 H    (0.80-1.20)  


 


Sodium    141  (140-148)  mmol/L


 


Potassium    4.0  (3.6-5.2)  mmol/L


 


Chloride    105  (100-108)  mmol/L


 


Carbon Dioxide    28  (21-32)  mmol/L


 


Anion Gap    8.4  (5.0-14.0)  mmol/L


 


BUN    15  (7-18)  mg/dL


 


Creatinine    1.3  D  (0.8-1.3)  mg/dL


 


Est Cr Clr Drug Dosing    52.96  mL/min


 


Estimated GFR (MDRD)    55 L  (>60)  


 


Glucose    123 H  ()  mg/dL


 


Calcium    8.0 L  (8.5-10.1)  mg/dL


 


Magnesium    2.4  (1.8-2.4)  mg/dL


 


Total Bilirubin    1.1 H  (0.2-1.0)  mg/dL


 


AST    38 H  (15-37)  U/L


 


ALT    29  (12-78)  U/L


 


Alkaline Phosphatase    129 H  ()  U/L


 


Total Protein    6.9  (6.4-8.2)  g/dL


 


Albumin    2.4 L  (3.4-5.0)  g/dL


 


Globulin    4.5 H  (2.3-3.5)  g/dL


 


Albumin/Globulin Ratio    0.5 L  (1.2-2.2)  











Parker Results Last 24 Hours: 


                                  Microbiology











 07/19/20 14:04 Aerobic Blood Culture - Preliminary





 Blood - Arm, Right    NO GROWTH AFTER 1 DAY





 Anaerobic Blood Culture - Preliminary





    NO GROWTH AFTER 1 DAY


 


 07/19/20 13:48 Aerobic Blood Culture - Preliminary





 Blood - Venous    NO GROWTH AFTER 1 DAY





 Anaerobic Blood Culture - Preliminary





    NO GROWTH AFTER 1 DAY











Med Orders - Current: 


                               Current Medications





Acetaminophen (Tylenol)  650 mg PO Q4H PRN


   PRN Reason: Pain (Mild 1-3)/fever


   Last Admin: 07/20/20 20:17 Dose:  650 mg


   Documented by: 


Albuterol (Proventil Neb Soln)  2.5 mg NEB Q4H PRN


   PRN Reason: Shortness Of Breath/wheezing


Enoxaparin Sodium (Lovenox)  40 mg SUBCUT DAILY [facility]


   Last Admin: 07/21/20 09:38 Dose:  40 mg


   Documented by: 


Meropenem 1 gm/ Sodium (Chloride)  100 mls @ 200 mls/hr IV Q8H [facility]


Insulin Human Lispro (Humalog)  0 unit SUBCUT Q6H [facility]; Protocol


   Last Admin: 07/21/20 05:51 Dose:  Not Given


   Documented by: 


Lactobacillus Rhamnosus (Culturelle)  1 cap PO BID [facility]


   Last Admin: 07/21/20 09:38 Dose:  1 cap


   Documented by: 


Lorazepam (Ativan)  0.5 mg IVPUSH Q4H PRN


   PRN Reason: Nausea/Vomiting


   Last Admin: 07/20/20 18:56 Dose:  0.5 mg


   Documented by: 


Lorazepam (Ativan)  0 mg IV ASDIRECTED [facility]; Protocol


Lorazepam (Ativan)  0 mg PO ASDIRECTED [facility]; Protocol


   Last Admin: 07/20/20 20:17 Dose:  1 mg


   Documented by: 


Magnesium Hydroxide (Milk Of Magnesia)  30 ml PO Q12H PRN


   PRN Reason: Constipation


Magnesium Oxide (Magnesium Oxide)  400 mg PO BID [facility]


   Last Admin: 07/21/20 09:39 Dose:  400 mg


   Documented by: 


Nystatin (Nystop)  0 gm TOP TID [facility]


   Last Admin: 07/21/20 09:39 Dose:  1 applic


   Documented by: 


Ondansetron HCl (Zofran)  4 mg IV Q6H PRN


   PRN Reason: Nausea/Vomiting


   Last Admin: 07/19/20 18:08 Dose:  4 mg


   Documented by: 


Ondansetron HCl (Zofran Odt)  4 mg PO Q6H PRN


   PRN Reason: Nausea able to take PO


Pantoprazole Sodium (Protonix***)  40 mg PO ACBREAKFAST [facility]


   Last Admin: 07/21/20 09:39 Dose:  40 mg


   Documented by: 


Senna/Docusate Sodium (Senna Plus)  1 tab PO BID PRN


   PRN Reason: Constipation


Warfarin Sodium (Coumadin)  5 mg PO DAILY@1300 [facility]





Discontinued Medications





Enoxaparin Sodium (Lovenox)  30 mg SUBCUT DAILY [facility]


   Last Admin: 07/20/20 10:24 Dose:  30 mg


   Documented by: 


Haloperidol Lactate (Haldol) Confirm Administered Dose 10 mg .ROUTE .STK-MED ONE


   Stop: 07/19/20 20:17


   Last Admin: 07/19/20 20:30 Dose:  10 mg


   Documented by: 


Haloperidol Lactate (Haldol)  10 mg IVPUSH ONETIME STA


   Stop: 07/19/20 20:19


   Last Admin: 07/19/20 20:42 Dose:  Not Given


   Documented by: 


Sodium Chloride (Normal Saline)  1,000 mls @ 1,000 mls/hr IV ASDIRECTED [facility]


   Last Admin: 07/19/20 13:52 Dose:  1,000 mls/hr


   Documented by: 


Vancomycin HCl 1 gm/ Sodium (Chloride)  250 mls @ 150 mls/hr IV ONETIME ONE


   Stop: 07/19/20 15:21


   Last Admin: 07/19/20 16:43 Dose:  Not Given


   Documented by: 


Vancomycin HCl 2 gm/ Sodium (Chloride)  500 mls @ 250 mls/hr IV ONETIME ONE


   Stop: 07/19/20 16:29


   Last Admin: 07/19/20 14:23 Dose:  250 mls/hr


   Documented by: 


Meropenem 1 gm/ Sodium (Chloride)  100 mls @ 200 mls/hr IV ONETIME ONE


   Stop: 07/19/20 14:59


   Last Admin: 07/19/20 14:22 Dose:  200 mls/hr


   Documented by: 


Sodium Chloride (Normal Saline)  1,000 mls @ 700 mls/hr IV ASDIRECTED [facility]


   Stop: 07/19/20 16:26


   Last Admin: 07/19/20 15:13 Dose:  700 mls/hr


   Documented by: 


Sodium Chloride (Normal Saline)  1,000 mls @ 125 mls/hr IV ASDIRECTED [facility]


   Last Admin: 07/21/20 05:51 Dose:  125 mls/hr


   Documented by: 


Vancomycin HCl 2 gm/ Sodium (Chloride)  500 mls @ 250 mls/hr IV Q24H [facility]


   Last Admin: 07/20/20 14:53 Dose:  250 mls/hr


   Documented by: 


Meropenem 500 mg/ Sodium (Chloride)  50 mls @ 100 mls/hr IV Q12H [facility]


   Last Admin: 07/21/20 02:53 Dose:  100 mls/hr


   Documented by: 


Sodium Chloride (Normal Saline)  500 mls @ 500 mls/hr IV .BOLUS ONE


   Stop: 07/19/20 22:01


   Last Admin: 07/19/20 21:15 Dose:  500 mls/hr


   Documented by: 


Sodium Chloride (Normal Saline)  1,000 mls @ 500 mls/hr IV STAT STA


   Stop: 07/20/20 00:12


   Last Admin: 07/19/20 22:15 Dose:  500 mls/hr


   Documented by: 


Magnesium Sulfate 2 gm/ Premix  50 mls @ 25 mls/hr IV Q6H [facility]


   Stop: 07/21/20 02:29


   Last Admin: 07/21/20 00:27 Dose:  25 mls/hr


   Documented by: 


Nystatin (Nystop)  1 gm TOP TID [facility]


   Last Admin: 07/19/20 21:45 Dose:  1 applic


   Documented by: 


Warfarin Sodium (Coumadin)  2.5 mg PO DAILY@1300 [facility]


   Last Admin: 07/20/20 17:55 Dose:  Not Given


   Documented by: 











- Exam


Quality Assessment: DVT Prophylaxis


General: Alert, Oriented, Cooperative, Mild Distress


Lungs: Clear to Auscultation, Normal Respiratory Effort


Cardiovascular: Regular Rate, Regular Rhythm, No Murmurs


GI/Abdominal Exam: Soft, Non-Tender, No Organomegaly, No Distention


Extremities: Non-Tender, Other (Ulcers both feet, cellulitis improved)





Sepsis Event Note





- Evaluation


Sepsis Screening Result: No Definite Risk





- Focused Exam


Vital Signs: 


                                   Vital Signs











  Temp Pulse Resp BP Pulse Ox


 


 07/21/20 11:00   121 H  13  158/95 H  99


 


 07/21/20 10:00   106 H  12  168/89 H  100


 


 07/21/20 09:00  98.4 F  88  13  154/76 H  97


 


 07/21/20 08:00   86  11 L  138/71  97


 


 07/21/20 07:00   101 H  14  166/91 H  97


 


 07/21/20 06:00   105 H  15  130/79  96


 


 07/21/20 05:00   106 H  15  110/55 L  95


 


 07/21/20 04:00   108 H  14  176/96 H  96


 


 07/21/20 03:00   94  12  168/74 H  94 L


 


 07/21/20 02:00   93  12  119/42 L  96


 


 07/21/20 01:12      95


 


 07/21/20 01:00   100  12  146/56 H  94 L


 


 07/21/20 00:00   90  12  109/53 L  98











Date Exam was Performed: 07/21/20


Time Exam was Performed: 11:44





- Problem List Review


Problem List Initiated/Reviewed/Updated: Yes





- My Orders


Last 24 Hours: 


My Active Orders





07/20/20 12:30


Magnesium Oxide   400 mg PO BID 





07/20/20 18:58


[employer] Assessment [RC] Q1H 





07/20/20 19:09


Notify Provider [RC] PRN 





07/20/20 19:15


LORazepam [Ativan]   See Protocol  IV ASDIRECTED 


LORazepam [Ativan]   See Protocol  PO ASDIRECTED 





07/21/20 11:40


Convert IV to Saline Lock [OM.PC] Routine 





07/21/20 11:42


Consult to Physical Therapy [PT Evaluation and Treatment] [CONS] Routine 





07/21/20 11:45


Meropenem [Merrem] 1 gm   Sodium Chloride 0.9% [Normal Saline] 100 ml IV Q8H 





07/21/20 Lunch


Consistent Carbohydrate Diet [DIET] 





07/21/20 13:00


Warfarin [Coumadin]   5 mg PO DAILY@1300 





07/22/20 05:00


BASIC METABOLIC PANEL,BMP [CHEM] Timed 


INR,PT,PROTHROMBIN TIME [COAG] Timed 














- Plan


Plan:: 





ASSESSMENT AND PLAN - 





Cellulitis/sepsis-good improvement over the last 24 hours with normalization of 

white blood cell count and no fevers.  CT scan of both feet showed no evidence 

of osteomyelitis


-Discontinue vancomycin 


-Continue meropenem


-Follow-up cultures





Acute kidney injury-improvement in renal function over the last 24 hours


-Management as above with labs in the morning





Hyperkalemia-resolved


-Repeat level in the morning





History of congestive heart failure-well compensated at the present time


-Resume beta-blocker therapy





Atrial fibrillation-unclear duration, currently rate controlled.  INR is 

subtherapeutic


-Hold calcium channel blocker at this time


-Warfarin 5 mg p.o. daily


-Recheck INR in a.m.





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  R

ecent hemoglobin A1c was 7.


-Hold metformin


-Low-dose sliding scale





Morbid obesity with BMI greater than 50





Maintenance issues - 


- DVT prophylaxis -enoxaparin


- GI prophylaxis -PPI


- Nutrition -nothing by mouth until he wakes up


- Richards catheter -placed in the emergency room for strict intake and output 

monitoring in a critical patient





CODE STATUS -DNR/DNI per advanced directive





Admission justification -this patient will be admitted for inpatient services 

and is medically appropriate meeting medical necessity for inpatient admission 

as outlined in my documentation.  I reasonably expect the patient will require 

inpatient services that span a period time over 2 midnights. I reasonably expect

 this patient to be discharged or transferred within 96 hours [facility]

 [facility]





Disposition -I would anticipate discharge home after the hospital stay





Primary care physician - Sven Mills NP

## 2020-07-22 RX ADMIN — FLUTICASONE PROPIONATE AND SALMETEROL SCH PUFF: 232; 14 POWDER, METERED RESPIRATORY (INHALATION) at 07:07

## 2020-07-22 RX ADMIN — INSULIN LISPRO SCH: 100 INJECTION, SOLUTION INTRAVENOUS; SUBCUTANEOUS at 01:25

## 2020-07-22 RX ADMIN — Medication SCH CAP: at 08:41

## 2020-07-22 RX ADMIN — INSULIN LISPRO SCH: 100 INJECTION, SOLUTION INTRAVENOUS; SUBCUTANEOUS at 05:49

## 2020-07-22 RX ADMIN — DILTIAZEM HYDROCHLORIDE SCH MG: 180 CAPSULE, COATED, EXTENDED RELEASE ORAL at 08:41

## 2020-07-22 RX ADMIN — NYSTATIN SCH APPLIC: 100000 POWDER TOPICAL at 09:02

## 2020-07-22 NOTE — [id number]
**Discharge Summary





- Hospital Course


Brief History: Mr. Levine is a 65-year-old gentleman who was admitted through the

emergency department with weakness and lethargy secondary to cellulitis 

associated with diabetic foot ulcers.





- Discharge Data


Discharge Date: 07/22/20


Discharge Disposition: Home, Self-Care 01


Condition: Fair





- Referral to Home Health


Date of Face to Face Encounter: 07/22/20


Reason for Homebound Status: Weakness


Primary Care Physician: 


PCP None





Skilled Need: Dressing changes, weakness; PT and OT





- Discharge Diagnosis/Problem(s)


(1) Cellulitis


SNOMED Code(s): 755346550


   ICD Code: L03.90 - CELLULITIS, UNSPECIFIED   Status: Acute   Current Visit: 

Yes   





(2) Acute kidney injury


SNOMED Code(s): 75322512, 29964407


   ICD Code: N17.9 - ACUTE KIDNEY FAILURE, UNSPECIFIED   Status: Acute   Current

Visit: Yes   





(3) Change in mental status


SNOMED Code(s): 719977976


   ICD Code: R41.82 - ALTERED MENTAL STATUS, UNSPECIFIED   Status: Acute   

Current Visit: Yes   


Qualifiers: 


   Altered mental status type: stupor   Qualified Code(s): R40.1 - Stupor   





(4) Diabetic foot ulcer


SNOMED Code(s): 818012016


   ICD Code: E11.621 - TYPE 2 DIABETES MELLITUS WITH FOOT ULCER; L97.509 - NON-

PRESSURE CHRONIC ULCER OTH PRT UNSP FOOT W UNSP SEVERITY   Status: Acute   

Current Visit: Yes   


Qualifiers: 


   Diabetic foot ulcer location: heel   Diabetes mellitus type: type 2   

Laterality: left   Non-pressure ulcer stage: with fat layer exposed   Qualified 

Code(s): E11.621 - Type 2 diabetes mellitus with foot ulcer; L97.422 - Non-

pressure chronic ulcer of left heel and midfoot with fat layer exposed   





(5) Sepsis


SNOMED Code(s): 27050777


   ICD Code: A41.9 - SEPSIS, UNSPECIFIED ORGANISM   Status: Acute   Current 

Visit: Yes   


Qualifiers: 


   Sepsis type: sepsis due to unspecified organism   Sepsis acute organ 

dysfunction status: with acute organ dysfunction   Severe sepsis acute organ 

dysfunction type: acute renal failure   Acute renal failure type: with acute 

renal cortical necrosis   Severe sepsis shock status: without septic shock   

Qualified Code(s): A41.9 - Sepsis, unspecified organism; R65.20 - Severe sepsis 

without septic shock; N17.1 - Acute kidney failure with acute cortical necrosis 

 





(6) Atrial fibrillation


SNOMED Code(s): 84618123


   ICD Code: I48.91 - UNSPECIFIED ATRIAL FIBRILLATION   Status: Chronic   

Current Visit: Yes   


Qualifiers: 


   Atrial fibrillation type: paroxysmal   Qualified Code(s): I48.0 - Paroxysmal 

atrial fibrillation   





(7) Morbid obesity with BMI of 50.0-59.9, adult


SNOMED Code(s): 877429112, 35382094853695


   ICD Code: E66.01 - MORBID (SEVERE) OBESITY DUE TO EXCESS CALORIES; Z68.43 - 

BODY MASS INDEX (BMI) 50.0-59.9, ADULT   Status: Chronic   Current Visit: Yes   





- Patient Summary/Data


Consults: 


                                  Consultations





07/20/20 10:00


Consult to Physician [CONS] Urgent 


   Consulting Provider: Maximino Briceno Call Completed to Consulting Physician: Fever, foot ulcers





07/21/20 11:42


Consult to Physical Therapy [PT Evaluation and Treatment] [CONS] Routine 


   Please Evaluate and Treat.


   PT Reason for Consult: Weakness


   This query below is only for informational purposes and is not editable.


   Admission Diagnosis/Problem: Acute kidney injury











Hospital Course: 





Mr. Levine is a 65-year-old gentleman who was admitted through the emergency 

department with weakness and lethargy secondary to bilateral diabetic foot 

ulcers and associated cellulitis.  He was very lethargic and able to provide 

only minimal hx. History was gathered from his emergency room providers and they

 did receive information from his significant other and paramedics.  Patient was

 seen in the emergency room a few days ago with aches and pains and just 

generally not feeling well.  He was noted to have a fast rate with his atrial 

fibrillation and his diltiazem was increased.  He did not want to be admitted to

 the hospital at that time though there was some concern about cellulitis around

 1 of his diabetic foot ulcers, notably on the right foot.  He was sent home 

with antibiotics.  Last night his significant other reported that he vomited 

after eating a small amount of supper.  He had multiple rounds of emesis and 

then fell asleep in his wheelchair.  This morning he was very lethargic so she 

called 911. Work-up in the emergency room has raised concern for sepsis with 

significant leukocytosis and lactic acidosis though the patient's vital signs 

are stable.  No obvious source of infection has been found so far.  His kidney 

function is down considerably from his baseline.  While in the emergency 

department he was felt to have probable sepsis and was given vigorous IV fluid 

replacement, blood cultures were obtained and he was started on IV antibiotic 

therapy.  Lactic acid level was elevated and after admission serial lactic acid 

levels were monitored and did normalize by the following morning.  He did not 

receive a full dose of IV fluids because of concern regarding his congestive 

heart failure.  He remained very lethargic over the first 24 hours of admission 

but by the time of discharge was alert oriented.  He was encouraged to consider 

staying another day for further IV antibiotic therapy which he refused.  He will

 be discharged home on oral antibiotic therapy with levofloxacin for an addition

al week.  Cultures had remained negative up until the time of discharge.  

Follow-up appointment will be scheduled with his primary care provider within 1 

week and Dr. Briceno within 2 weeks.  CT scan of both feet had been obtained 

during hospitalization and showed no evidence of underlying osteomyelitis.  Home

 care will be scheduled for dressing changes and he will be scheduled for an 

appointment in the Coumadin clinic in 2 to 3 days.





- Patient Instructions


Diet: Low Sodium


Activity: As Tolerated


Other/Special Instructions: Please schedule Coumadin clinic appointment within 2

 to 3 days.  Please schedule follow-up appointment with primary care provider 

within 1 week.  Please schedule follow-up appointment with Dr. Briceno in 2 

weeks.  Please arrange for home care services for daily dressing changes.





- Discharge Plan


*PRESCRIPTION DRUG MONITORING PROGRAM REVIEWED*: Not Applicable


*COPY OF PRESCRIPTION DRUG MONITORING REPORT IN PATIENT NICA: Not Applicable


Prescriptions/Med Rec: 


Lactobacillus Rhamnosus GG [Culturelle] 1 cap PO BID #60 cap


levoFLOXacin [Levaquin] 500 mg PO DAILY #7 tab


Home Medications: 


                                    Home Meds





Allopurinol [Zyloprim] 300 mg PO DAILY 07/16/20 [History]


Colchicine [Colcrys] 0.6 mg PO TID PRN 07/16/20 [History]


Diltiazem HCl [Diltiazem 24Hr ER] 180 mg PO DAILY 07/16/20 [History]


Furosemide 40 mg PO DAILY 07/16/20 [History]


Magnesium Oxide 400 mg PO BID 07/16/20 [History]


Metoprolol Succinate 75 mg PO DAILY 07/16/20 [History]


Potassium Chloride 40 meq PO BID 07/16/20 [History]


Sertraline [Zoloft] 50 mg PO DAILY 07/16/20 [History]


hydrALAZINE [Apresoline] 25 mg PO Q8H 07/16/20 [History]


metOLazone [Metolazone] 5 mg PO DAILY 07/16/20 [History]


traZODone 100 mg PO BEDTIME 07/16/20 [History]


Amiodarone [Cordarone] 200 mg PO DAILY 07/19/20 [History]


Amoxicillin/Potassium Clav [Augmentin 875-125 Tablet] 1 each PO BID 07/19/20 

[History]


Aspirin [Halfprin] 81 mg PO DAILY 07/19/20 [History]


Baclofen 10 mg PO TID PRN 07/19/20 [History]


Calcium Carbonate [Tums] 1,000 mg PO DAILY PRN 07/19/20 [History]


Folic Acid 1 mg PO DAILY 07/19/20 [History]


Meclizine [Antivert] 25 mg PO DAILY 07/19/20 [History]


Multivitamin [Multi-Vitamin Daily] 1 each PO DAILY 07/19/20 [History]


Nitroglycerin [Nitrostat] 0.4 mg PO Q5M PRN 07/19/20 [History]


Silver Sulfadiazine [Silvadene 1% Cream 400 GM] 1 applic TOP DAILY 07/19/20 

[History]


Warfarin Sliding Scale [Coumadin Sliding Scale] 5 mg PO WEEKLY 07/19/20 

[History]


Warfarin Sodium [Jantoven] 7.5 mg PO DAILY 07/19/20 [History]


metFORMIN HCl [Metformin HCl] 1,000 mg PO BID 07/19/20 [History]


ondansetron HCL [Zofran] 4 mg PO Q8H PRN 07/19/20 [History]


Fluticasone/Vilanterol [Breo Ellipta 100-25 MCG Inhalation Kit] 2 inhalation INH

DAILY 07/21/20 [History]


Lactobacillus Rhamnosus GG [Culturelle] 1 cap PO BID #60 cap 07/22/20 [Rx]


levoFLOXacin [Levaquin] 500 mg PO DAILY #7 tab 07/22/20 [Rx]








Referrals: 


Sven Mills NP [Nurse Practitioner] - 07/30/20 1:00 pm (Please arrive 15 minutes

early to register for your appointments.)


Maximino Briceno MD [Physician] - 07/30/20 12:20 pm


()





- Discharge Summary/Plan Comment


DC Time >30 min.: No





- Patient Data


Vitals - Most Recent: 


                                Last Vital Signs











Temp  98.5 F   07/22/20 08:55


 


Pulse  97   07/22/20 08:55


 


Resp  14   07/22/20 08:55


 


BP  146/74 H  07/22/20 08:55


 


Pulse Ox  96   07/22/20 08:55











Weight - Most Recent: 351 lb 9.6 oz


I&O - Last 24 hours: 


                                 Intake & Output











 07/21/20 07/22/20 07/22/20





 22:59 06:59 14:59


 


Intake Total 852 200 


 


Output Total 350 1100 375


 


Balance 502 900 -375











Lab Results - Last 24 hrs: 


                         Laboratory Results - last 24 hr











  07/22/20 07/22/20 Range/Units





  05:30 05:30 


 


PT  14.9 H   (9.5-12.0)  sec


 


INR  1.38 H   (0.80-1.20)  


 


Sodium   140  (140-148)  mmol/L


 


Potassium   3.7  (3.6-5.2)  mmol/L


 


Chloride   105  (100-108)  mmol/L


 


Carbon Dioxide   29  (21-32)  mmol/L


 


Anion Gap   6.3  (5.0-14.0)  mmol/L


 


BUN   12  (7-18)  mg/dL


 


Creatinine   1.1  (0.8-1.3)  mg/dL


 


Est Cr Clr Drug Dosing   62.59  mL/min


 


Estimated GFR (MDRD)   > 60  (>60)  


 


Glucose   117 H  ()  mg/dL


 


Calcium   8.3 L  (8.5-10.1)  mg/dL











RASHARD Results - Last 24 hrs: 


                                  Microbiology











 07/19/20 14:04 Aerobic Blood Culture - Preliminary





 Blood - Arm, Right    NO GROWTH AFTER 2 DAYS





 Anaerobic Blood Culture - Preliminary





    NO GROWTH AFTER 2 DAYS


 


 07/19/20 13:48 Aerobic Blood Culture - Preliminary





 Blood - Venous    NO GROWTH AFTER 2 DAYS





 Anaerobic Blood Culture - Preliminary





    NO GROWTH AFTER 2 DAYS











Med Orders - Current: 


                               Current Medications





Acetaminophen (Tylenol)  650 mg PO Q4H PRN


   PRN Reason: Pain (Mild 1-3)/fever


   Last Admin: 07/20/20 20:17 Dose:  650 mg


   Documented by: 


Albuterol (Proventil Neb Soln)  2.5 mg NEB Q4H PRN


   PRN Reason: Shortness Of Breath/wheezing


Allopurinol (Zyloprim)  300 mg PO DAILY [facility]


   Last Admin: 07/22/20 08:43 Dose:  300 mg


   Documented by: 


Amiodarone HCl (Cordarone)  200 mg PO DAILY [facility]


   Last Admin: 07/22/20 08:41 Dose:  200 mg


   Documented by: 


Aspirin (Halfprin)  81 mg PO DAILY [facility]


   Last Admin: 07/22/20 08:42 Dose:  81 mg


   Documented by: 


Diltiazem HCl (Cardizem Cd)  180 mg PO DAILY [facility]


   Last Admin: 07/22/20 08:41 Dose:  180 mg


   Documented by: 


Enoxaparin Sodium (Lovenox)  40 mg SUBCUT DAILY [facility]


   Last Admin: 07/21/20 09:38 Dose:  40 mg


   Documented by: 


Furosemide (Lasix)  40 mg PO DAILY [facility]


   Last Admin: 07/22/20 08:42 Dose:  40 mg


   Documented by: 


Meropenem 1 gm/ Sodium (Chloride)  100 mls @ 200 mls/hr IV Q8H [facility]


   Last Admin: 07/22/20 03:48 Dose:  200 mls/hr


   Documented by: 


Insulin Human Lispro (Humalog)  0 unit SUBCUT Q6H [facility]; Protocol


   Last Admin: 07/22/20 05:49 Dose:  Not Given


   Documented by: 


Lactobacillus Rhamnosus (Culturelle)  1 cap PO BID [facility]


   Last Admin: 07/22/20 08:41 Dose:  1 cap


   Documented by: 


Lorazepam (Ativan)  0.5 mg IVPUSH Q4H PRN


   PRN Reason: Nausea/Vomiting


   Last Admin: 07/20/20 18:56 Dose:  0.5 mg


   Documented by: 


Lorazepam (Ativan)  0 mg IV ASDIRECTED [facility]; Protocol


Lorazepam (Ativan)  0 mg PO ASDIRECTED [facility]; Protocol


   Last Admin: 07/21/20 21:19 Dose:  1 mg


   Documented by: 


Magnesium Hydroxide (Milk Of Magnesia)  30 ml PO Q12H PRN


   PRN Reason: Constipation


Magnesium Oxide (Magnesium Oxide)  400 mg PO BID [facility]


   Last Admin: 07/22/20 08:43 Dose:  400 mg


   Documented by: 


Metoprolol Succinate 50 mg/ (Metoprolol Succinate 25 mg)  75 mg PO DAILY [facility]


   Last Admin: 07/22/20 08:42 Dose:  75 mg


   Documented by: 


Nystatin (Nystop)  0 gm TOP TID [facility]


   Last Admin: 07/22/20 09:02 Dose:  2 applic


   Documented by: 


Ondansetron HCl (Zofran)  4 mg IV Q6H PRN


   PRN Reason: Nausea/Vomiting


   Last Admin: 07/19/20 18:08 Dose:  4 mg


   Documented by: 


Ondansetron HCl (Zofran Odt)  4 mg PO Q6H PRN


   PRN Reason: Nausea able to take PO


Pantoprazole Sodium (Protonix***)  40 mg PO ACBREAKFAST [facility]


   Last Admin: 07/22/20 07:22 Dose:  40 mg


   Documented by: 


Fluticasone/Salmeterol (Fluticasone-Salmeterol 232-14 Mcg Powder Inha)  2 puff 

INH BIDRT [facility]


   Last Admin: 07/22/20 07:07 Dose:  2 puff


   Documented by: 


Senna/Docusate Sodium (Senna Plus)  1 tab PO BID PRN


   PRN Reason: Constipation


Sertraline HCl (Zoloft)  50 mg PO DAILY [facility]


   Last Admin: 07/22/20 08:42 Dose:  50 mg


   Documented by: 


Warfarin Sodium (Coumadin)  5 mg PO DAILY@1300 [facility]


   Last Admin: 07/21/20 12:39 Dose:  5 mg


   Documented by: 





Discontinued Medications





Enoxaparin Sodium (Lovenox)  30 mg SUBCUT DAILY [facility]


   Last Admin: 07/20/20 10:24 Dose:  30 mg


   Documented by: 


Haloperidol Lactate (Haldol) Confirm Administered Dose 10 mg .ROUTE .STK-MED ONE


   Stop: 07/19/20 20:17


   Last Admin: 07/19/20 20:30 Dose:  10 mg


   Documented by: 


Haloperidol Lactate (Haldol)  10 mg IVPUSH ONETIME STA


   Stop: 07/19/20 20:19


   Last Admin: 07/19/20 20:42 Dose:  Not Given


   Documented by: 


Sodium Chloride (Normal Saline)  1,000 mls @ 1,000 mls/hr IV ASDIRECTED [facility]


   Last Admin: 07/19/20 13:52 Dose:  1,000 mls/hr


   Documented by: 


Vancomycin HCl 1 gm/ Sodium (Chloride)  250 mls @ 150 mls/hr IV ONETIME ONE


   Stop: 07/19/20 15:21


   Last Admin: 07/19/20 16:43 Dose:  Not Given


   Documented by: 


Vancomycin HCl 2 gm/ Sodium (Chloride)  500 mls @ 250 mls/hr IV ONETIME ONE


   Stop: 07/19/20 16:29


   Last Admin: 07/19/20 14:23 Dose:  250 mls/hr


   Documented by: 


Meropenem 1 gm/ Sodium (Chloride)  100 mls @ 200 mls/hr IV ONETIME ONE


   Stop: 07/19/20 14:59


   Last Admin: 07/19/20 14:22 Dose:  200 mls/hr


   Documented by: 


Sodium Chloride (Normal Saline)  1,000 mls @ 700 mls/hr IV ASDIRECTED [facility]


   Stop: 07/19/20 16:26


   Last Admin: 07/19/20 15:13 Dose:  700 mls/hr


   Documented by: 


Sodium Chloride (Normal Saline)  1,000 mls @ 125 mls/hr IV ASDIRECTED [facility]


   Last Admin: 07/21/20 05:51 Dose:  125 mls/hr


   Documented by: 


Vancomycin HCl 2 gm/ Sodium (Chloride)  500 mls @ 250 mls/hr IV Q24H [facility]


   Last Admin: 07/20/20 14:53 Dose:  250 mls/hr


   Documented by: 


Meropenem 500 mg/ Sodium (Chloride)  50 mls @ 100 mls/hr IV Q12H [facility]


   Last Admin: 07/21/20 02:53 Dose:  100 mls/hr


   Documented by: 


Sodium Chloride (Normal Saline)  500 mls @ 500 mls/hr IV .BOLUS ONE


   Stop: 07/19/20 22:01


   Last Admin: 07/19/20 21:15 Dose:  500 mls/hr


   Documented by: 


Sodium Chloride (Normal Saline)  1,000 mls @ 500 mls/hr IV STAT STA


   Stop: 07/20/20 00:12


   Last Admin: 07/19/20 22:15 Dose:  500 mls/hr


   Documented by: 


Magnesium Sulfate 2 gm/ Premix  50 mls @ 25 mls/hr IV Q6H [facility]


   Stop: 07/21/20 02:29


   Last Admin: 07/21/20 00:27 Dose:  25 mls/hr


   Documented by: 


Magnesium Oxide (Magnesium Oxide)  400 mg PO BID [facility]


   Last Admin: 07/21/20 09:39 Dose:  400 mg


   Documented by: 


Nystatin (Nystop)  1 gm TOP TID [facility]


   Last Admin: 07/19/20 21:45 Dose:  1 applic


   Documented by: 


Warfarin Sodium (Coumadin)  2.5 mg PO DAILY@1300 [facility]


   Last Admin: 07/20/20 17:55 Dose:  Not Given


   Documented by: 











- Exam


Quality Assessment: Reports: DVT Prophylaxis


General: Reports: Alert, Oriented, Cooperative, No Acute Distress


Lungs: Reports: Clear to Auscultation, Normal Respiratory Effort


Cardiovascular: Reports: Regular Rate, Regular Rhythm, No Murmurs


GI/Abdominal Exam: Soft, Non-Tender, No Organomegaly, No Distention


Extremities: Non-Tender, Pedal Edema

## 2020-10-08 ENCOUNTER — HOSPITAL ENCOUNTER (EMERGENCY)
Dept: HOSPITAL 11 - JP.ED | Age: 65
LOS: 1 days | Discharge: HOME | End: 2020-10-09
Payer: MEDICARE

## 2020-10-08 DIAGNOSIS — Z79.899: ICD-10-CM

## 2020-10-08 DIAGNOSIS — I50.9: Primary | ICD-10-CM

## 2020-10-08 DIAGNOSIS — Z79.82: ICD-10-CM

## 2020-10-08 DIAGNOSIS — Z79.01: ICD-10-CM

## 2020-10-08 DIAGNOSIS — E11.9: ICD-10-CM

## 2020-10-08 DIAGNOSIS — I48.91: ICD-10-CM

## 2020-10-08 DIAGNOSIS — G47.30: ICD-10-CM

## 2020-10-08 DIAGNOSIS — Z90.49: ICD-10-CM

## 2020-10-08 DIAGNOSIS — Z79.84: ICD-10-CM

## 2020-10-08 DIAGNOSIS — E66.01: ICD-10-CM

## 2020-10-08 DIAGNOSIS — Z88.8: ICD-10-CM

## 2020-10-08 NOTE — EDM.PDOC
ED HPI GENERAL MEDICAL PROBLEM





- General


Chief Complaint: Respiratory Problem


Stated Complaint: MEDICAL VIA [facility]


Time Seen by Provider: 10/08/20 20:44


Source of Information: Reports: Patient, EMS, Family


History Limitations: Reports: No Limitations





- History of Present Illness


INITIAL COMMENTS - FREE TEXT/NARRATIVE: 





Patient presents by ambulance from home after his wife called 911 reportedly 

because the patient has been lethargic and sleeping for the past week.  He 

denies fever or chills, nausea or vomiting.  He has atrial fibrillation and his 

heart rate varies with that.  He has had no other recent visits to the clinic 

and has had no medication changes.  He states that "I do not know why my wife 

called the ambulance?"


Onset: Today, Gradual


Location: Reports: Generalized


Severity: Mild


Improves with: Reports: None


Worsens with: Reports: None





- Related Data


                                    Allergies











Allergy/AdvReac Type Severity Reaction Status Date / Time


 


carvedilol [From Coreg] Allergy  Other Verified 07/16/20 18:37


 


gabapentin Allergy  Hives Verified 07/16/20 18:37


 


spironolactone Allergy  Hives Verified 07/16/20 18:37











Home Meds: 


                                    Home Meds





Allopurinol [Zyloprim] 300 mg PO DAILY 07/16/20 [History]


Colchicine [Colcrys] 0.6 mg PO TID PRN 07/16/20 [History]


Diltiazem HCl [Diltiazem 24Hr ER] 180 mg PO DAILY 07/16/20 [History]


Furosemide 40 mg PO DAILY 07/16/20 [History]


Magnesium Oxide 400 mg PO BID 07/16/20 [History]


Metoprolol Succinate 75 mg PO DAILY 07/16/20 [History]


Potassium Chloride 40 meq PO BID 07/16/20 [History]


Sertraline [Zoloft] 50 mg PO DAILY 07/16/20 [History]


hydrALAZINE [Apresoline] 25 mg PO Q8H 07/16/20 [History]


metOLazone [Metolazone] 5 mg PO DAILY 07/16/20 [History]


traZODone 100 mg PO BEDTIME 07/16/20 [History]


Amiodarone [Cordarone] 200 mg PO DAILY 07/19/20 [History]


Aspirin [Halfprin] 81 mg PO DAILY 07/19/20 [History]


Baclofen 10 mg PO TID PRN 07/19/20 [History]


Calcium Carbonate [Tums] 1,000 mg PO DAILY PRN 07/19/20 [History]


Folic Acid 1 mg PO DAILY 07/19/20 [History]


Meclizine [Antivert] 25 mg PO DAILY 07/19/20 [History]


Multivitamin [Multi-Vitamin Daily] 1 each PO DAILY 07/19/20 [History]


Nitroglycerin [Nitrostat] 0.4 mg PO Q5M PRN 07/19/20 [History]


Silver Sulfadiazine [Silvadene 1% Cream 400 GM] 1 applic TOP DAILY 07/19/20 

[History]


Warfarin Sliding Scale [Coumadin Sliding Scale] 5 mg PO WEEKLY 07/19/20 

[History]


Warfarin Sodium [Jantoven] 7.5 mg PO DAILY 07/19/20 [History]


metFORMIN HCl [Metformin HCl] 1,000 mg PO BID 07/19/20 [History]


ondansetron HCL [Zofran] 4 mg PO Q8H PRN 07/19/20 [History]


Fluticasone/Vilanterol [Breo Ellipta 100-25 MCG Inhalation Kit] 2 inhalation INH

DAILY 07/21/20 [History]


Lactobacillus Rhamnosus GG [Culturelle] 1 cap PO BID #60 cap 07/22/20 [Rx]


levoFLOXacin [Levaquin] 500 mg PO DAILY #7 tab 07/22/20 [Rx]











Past Medical History


HEENT History: Reports: Other (See Below)


Other HEENT History: esophageal varices


Cardiovascular History: Reports: Afib, Heart Failure


Psychiatric History: Reports: Addiction


Endocrine/Metabolic History: Reports: Diabetes, Type II





- Infectious Disease History


Infectious Disease History: Reports: Chicken Pox





- Past Surgical History


GI Surgical History: Reports: Cholecystectomy





Social & Family History





- Family History


Family Medical History: Unobtainable





- Tobacco Use


Smoking Status *Q: Never Smoker





- Caffeine Use


Caffeine Use: Reports: Soda


Caffeine Use Comment: unable to obtain





- Recreational Drug Use


Recreational Drug Use: No





ED ROS GENERAL





- Review of Systems


Review Of Systems: See Below


Constitutional: Reports: Malaise, Weakness, Fatigue.  Denies: Fever, Chills


HEENT: Reports: No Symptoms


Respiratory: Reports: Shortness of Breath


Cardiovascular: Reports: Dyspnea on Exertion.  Denies: Chest Pain


Endocrine: Reports: Fatigue


GI/Abdominal: Reports: No Symptoms.  Denies: Abdominal Pain


: Reports: Incontinence





ED EXAM, GENERAL





- Physical Exam


Exam: See Below


Exam Limited By: No Limitations


General Appearance: Alert (Falls asleep easily however)


Eye Exam: Bilateral Eye: EOMI


Ears: Normal External Exam


Respiratory/Chest: No Respiratory Distress, Lungs Clear


Cardiovascular: Systolic Murmur, Irregularly Irregular


Back Exam: Normal Inspection





EKG INTERPRETATION


EKG Date: 10/08/20


Time: 20:58


Rhythm: A-Fib


Rate (Beats/Min): 102


Axis: Normal


P-Wave: Present


QRS: Normal


ST-T: Normal





Course





- Vital Signs


Last Recorded V/S: 


                                Last Vital Signs











Temp  37.0 C   10/08/20 23:57


 


Pulse  79   10/08/20 23:57


 


Resp  11 L  10/08/20 23:57


 


BP  95/62   10/08/20 23:57


 


Pulse Ox  97   10/08/20 23:57














- Orders/Labs/Meds


Orders: 


                               Active Orders 24 hr











 Category Date Time Status


 


 EKG 12 Lead [EK] Routine Ther  10/08/20 20:55 Ordered











Labs: 


                                Laboratory Tests











  10/08/20 10/08/20 10/08/20 Range/Units





  21:09 21:09 21:09 


 


WBC  7.1    (4.5-11.0)  K/uL


 


RBC  4.23 L    (4.30-5.90)  M/uL


 


Hgb  12.2    (12.0-15.0)  g/dL


 


Hct  38.2 L    (40.0-54.0)  %


 


MCV  90    (80-98)  fL


 


MCH  29    (27-31)  pg


 


MCHC  32    (32-36)  %


 


Plt Count  173    (150-400)  K/uL


 


Neut % (Auto)  73 H    (36-66)  %


 


Lymph % (Auto)  13 L    (24-44)  %


 


Mono % (Auto)  12 H    (2-6)  %


 


Eos % (Auto)  2    (2-4)  %


 


Baso % (Auto)  0    (0-1)  %


 


Sodium   134 L   (140-148)  mmol/L


 


Potassium   3.2 L   (3.6-5.2)  mmol/L


 


Chloride   97 L   (100-108)  mmol/L


 


Carbon Dioxide   30   (21-32)  mmol/L


 


Anion Gap   10.2   (5.0-14.0)  mmol/L


 


BUN   17   (7-18)  mg/dL


 


Creatinine   1.4 H   (0.8-1.3)  mg/dL


 


Est Cr Clr Drug Dosing   50.89   mL/min


 


Estimated GFR (MDRD)   51 L   (>60)  


 


Glucose   334 H   ()  mg/dL


 


Calcium   7.9 L   (8.5-10.1)  mg/dL


 


Total Bilirubin   0.4  D   (0.2-1.0)  mg/dL


 


AST   31   (15-37)  U/L


 


ALT   25   (12-78)  U/L


 


Alkaline Phosphatase   147 H   ()  U/L


 


Troponin I    < 0.017  (0.000-0.056)  ng/mL


 


Total Protein   7.2   (6.4-8.2)  g/dL


 


Albumin   2.4 L   (3.4-5.0)  g/dL


 


Globulin   4.8 H   (2.3-3.5)  g/dL


 


Albumin/Globulin Ratio   0.5 L   (1.2-2.2)  














- Re-Assessments/Exams


Free Text/Narrative Re-Assessment/Exam: 





10/09/20 05:33


The patient slept when not actively engaged in conversation.  At some points 

while sleeping his blood pressure was in the 80s but then teddy again and the 

systolic pressure remained at 110 mmHg or above.  He remained in no distress.  

Eventually his wife came to express her frustration with him not following 

through with medications and other things related to health we acknowledged her 

concern but also reviewed the fact that no one can force him to do those things.

  He did talk about his CPAP apparatus and it sounds as though they need to be 

replaced.  I recommend he contact his primary care team tomorrow and ask them 

about orders to have it replaced.  He has a follow-up appointment later this 

month but I recommend that he get the new CPAP care, use it, provide feedback to

 his primary care team.  He was discharged after quite some time in stable 

condition.





Departure





- Departure


Time of Disposition: 00:39


Disposition: Home, Self-Care 01


Clinical Impression: 


 CHF, Congestive heart failure, Atrial fibrillation with rapid ventricular 

response, Morbid obesity with BMI of 50.0-59.9, adult





Sleep apnea


Qualifiers:


 Sleep apnea type: unspecified type Qualified Code(s): G47.30 - Sleep apnea, 

unspecified





Fatigue


Qualifiers:


 Fatigue type: unspecified Qualified Code(s): R53.83 - Other fatigue








- Discharge Information


Instructions:  Sleep Apnea, Easy-to-Read, Heart Failure, Diagnosis, 

Easy-to-Read, Atrial Fibrillation, Easy-to-Read, Obesity, Adult, Easy-to-Read


Referrals: 


PCP,None [Primary Care Provider] - 


Forms:  ED Department Discharge


Additional Instructions: 


Continue current recommended medications.  Talk to your primary care team today 

and ask about getting orders for a new CPAP unit.  If you are sleeping better at

night you will likely have more energy during the day.  Return to ER if feeling 

worse but again we strongly encourage you to fully use the current prescribed 

care plan that you have.





Sepsis Event Note (ED)





- Focused Exam


Vital Signs: 


                                   Vital Signs











  Temp Pulse Resp BP Pulse Ox


 


 10/08/20 23:57  37.0 C  79  11 L  95/62  97


 


 10/08/20 23:10   79  11 L  95/62 


 


 10/08/20 22:23   76  13  99/56 L 


 


 10/08/20 21:18   97   117/81 


 


 10/08/20 20:42  37.0 C  87  16  104/70  97














- My Orders


Last 24 Hours: 


My Active Orders





10/08/20 20:55


EKG 12 Lead [EK] Routine 














- Assessment/Plan


Last 24 Hours: 


My Active Orders





10/08/20 20:55


EKG 12 Lead [EK] Routine

## 2020-10-08 NOTE — CRLCR
INDICATION: Shortness of breath, weakness



TECHNIQUE: Chest radiograph 3 views



COMPARISON: 07/16/2020



FINDINGS: Moderate degradation of image quality noted due to body habitus. 



Mediastinum: The mediastinum is normal in appearance. New moderate to 

severe cardiomegaly is noted. 



Lung: There is a stable 6 mm calcified granuloma in the left midlung. No 

sign of pleural effusion seen. No pneumothorax is identified. 



Bone and Soft tissue: Unremarkable for age. 



IMPRESSION: 



1. New moderate to severe cardiomegaly is noted.



Dictated by Killian Vicente MD @ 10/8/2020 10:07:53 PM



Dictated by: Killian Vicente MD @ 10/08/2020 22:07:58



(Electronically Signed)

## 2020-11-06 ENCOUNTER — HOSPITAL ENCOUNTER (INPATIENT)
Dept: HOSPITAL 11 - JP.ED | Age: 65
LOS: 2 days | Discharge: HOME | DRG: 917 | End: 2020-11-08
Attending: SURGERY | Admitting: SURGERY
Payer: MEDICARE

## 2020-11-06 DIAGNOSIS — Z79.84: ICD-10-CM

## 2020-11-06 DIAGNOSIS — T50.905A: ICD-10-CM

## 2020-11-06 DIAGNOSIS — K92.0: ICD-10-CM

## 2020-11-06 DIAGNOSIS — Z20.828: ICD-10-CM

## 2020-11-06 DIAGNOSIS — T54.2X1A: Primary | ICD-10-CM

## 2020-11-06 DIAGNOSIS — K92.2: ICD-10-CM

## 2020-11-06 DIAGNOSIS — E86.0: ICD-10-CM

## 2020-11-06 DIAGNOSIS — Z79.899: ICD-10-CM

## 2020-11-06 DIAGNOSIS — G92: ICD-10-CM

## 2020-11-06 DIAGNOSIS — E66.9: ICD-10-CM

## 2020-11-06 DIAGNOSIS — Z79.01: ICD-10-CM

## 2020-11-06 DIAGNOSIS — Z79.82: ICD-10-CM

## 2020-11-06 DIAGNOSIS — I50.9: ICD-10-CM

## 2020-11-06 DIAGNOSIS — Z88.8: ICD-10-CM

## 2020-11-06 DIAGNOSIS — F10.21: ICD-10-CM

## 2020-11-06 DIAGNOSIS — Z90.49: ICD-10-CM

## 2020-11-06 DIAGNOSIS — I48.91: ICD-10-CM

## 2020-11-06 DIAGNOSIS — E11.9: ICD-10-CM

## 2020-11-06 PROCEDURE — 36600 WITHDRAWAL OF ARTERIAL BLOOD: CPT

## 2020-11-06 PROCEDURE — 86901 BLOOD TYPING SEROLOGIC RH(D): CPT

## 2020-11-06 PROCEDURE — 86850 RBC ANTIBODY SCREEN: CPT

## 2020-11-06 PROCEDURE — 43244 EGD VARICES LIGATION: CPT

## 2020-11-06 PROCEDURE — 96374 THER/PROPH/DIAG INJ IV PUSH: CPT

## 2020-11-06 PROCEDURE — 96376 TX/PRO/DX INJ SAME DRUG ADON: CPT

## 2020-11-06 PROCEDURE — 84484 ASSAY OF TROPONIN QUANT: CPT

## 2020-11-06 PROCEDURE — 06L38CZ OCCLUSION OF ESOPHAGEAL VEIN WITH EXTRALUMINAL DEVICE, VIA NATURAL OR ARTIFICIAL OPENING ENDOSCOPIC: ICD-10-PCS | Performed by: SURGERY

## 2020-11-06 PROCEDURE — 85610 PROTHROMBIN TIME: CPT

## 2020-11-06 PROCEDURE — 81001 URINALYSIS AUTO W/SCOPE: CPT

## 2020-11-06 PROCEDURE — 85379 FIBRIN DEGRADATION QUANT: CPT

## 2020-11-06 PROCEDURE — 93010 ELECTROCARDIOGRAM REPORT: CPT

## 2020-11-06 PROCEDURE — 83880 ASSAY OF NATRIURETIC PEPTIDE: CPT

## 2020-11-06 PROCEDURE — 85025 COMPLETE CBC W/AUTO DIFF WBC: CPT

## 2020-11-06 PROCEDURE — U0002 COVID-19 LAB TEST NON-CDC: HCPCS

## 2020-11-06 PROCEDURE — 80307 DRUG TEST PRSMV CHEM ANLYZR: CPT

## 2020-11-06 PROCEDURE — 93005 ELECTROCARDIOGRAM TRACING: CPT

## 2020-11-06 PROCEDURE — 96375 TX/PRO/DX INJ NEW DRUG ADDON: CPT

## 2020-11-06 PROCEDURE — 87040 BLOOD CULTURE FOR BACTERIA: CPT

## 2020-11-06 PROCEDURE — 82140 ASSAY OF AMMONIA: CPT

## 2020-11-06 PROCEDURE — 71045 X-RAY EXAM CHEST 1 VIEW: CPT

## 2020-11-06 PROCEDURE — 80305 DRUG TEST PRSMV DIR OPT OBS: CPT

## 2020-11-06 PROCEDURE — 80053 COMPREHEN METABOLIC PANEL: CPT

## 2020-11-06 PROCEDURE — 36415 COLL VENOUS BLD VENIPUNCTURE: CPT

## 2020-11-06 PROCEDURE — 86900 BLOOD TYPING SEROLOGIC ABO: CPT

## 2020-11-06 PROCEDURE — 83605 ASSAY OF LACTIC ACID: CPT

## 2020-11-06 PROCEDURE — 82803 BLOOD GASES ANY COMBINATION: CPT

## 2020-11-06 PROCEDURE — 99285 EMERGENCY DEPT VISIT HI MDM: CPT

## 2020-11-06 PROCEDURE — C9113 INJ PANTOPRAZOLE SODIUM, VIA: HCPCS

## 2020-11-06 PROCEDURE — 83735 ASSAY OF MAGNESIUM: CPT

## 2020-11-06 PROCEDURE — 70450 CT HEAD/BRAIN W/O DYE: CPT

## 2020-11-06 RX ADMIN — METOROPROLOL TARTRATE SCH MG: 5 INJECTION, SOLUTION INTRAVENOUS at 14:45

## 2020-11-06 RX ADMIN — HYDRALAZINE HYDROCHLORIDE SCH MG: 20 INJECTION INTRAMUSCULAR; INTRAVENOUS at 22:08

## 2020-11-06 RX ADMIN — DILTIAZEM HYDROCHLORIDE SCH MLS/HR: 100 INJECTION, POWDER, LYOPHILIZED, FOR SOLUTION INTRAVENOUS at 14:47

## 2020-11-06 RX ADMIN — DILTIAZEM HYDROCHLORIDE SCH MLS/HR: 100 INJECTION, POWDER, LYOPHILIZED, FOR SOLUTION INTRAVENOUS at 22:56

## 2020-11-06 RX ADMIN — METOROPROLOL TARTRATE SCH MG: 5 INJECTION, SOLUTION INTRAVENOUS at 20:24

## 2020-11-06 RX ADMIN — MOMETASONE FUROATE AND FORMOTEROL FUMARATE DIHYDRATE SCH: 200; 5 AEROSOL RESPIRATORY (INHALATION) at 22:02

## 2020-11-06 RX ADMIN — HYDRALAZINE HYDROCHLORIDE SCH MG: 20 INJECTION INTRAMUSCULAR; INTRAVENOUS at 15:49

## 2020-11-06 NOTE — CRLCT
INDICATION:



Confusion 



COMPARISON:



A similar examination dated July 19, 2020 



TECHNIQUE:



CT examination of the head was performed as axial sections without 

intravenous contrast. Images were obtained from the vertex of the skull 

through the skull base.



Please note that all CT scans at this facility use dose modulation, 

iterative reconstruction, and/or weight-based dosing when appropriate to 

reduce radiation dose to as low as reasonably achievable. 



FINDINGS:



The brain shows no sign of mass lesion, mass effect, hemorrhage, or edema. 



There are involutional changes. There is moderate cortical atrophy and 

there is moderate white matter disease. There is no hydrocephalus.



The visualized portions of the orbits are normal in appearance.



The osseous structures are normal in appearance with no sign of abnormality 

in the skull base or calvarium. 



IMPRESSION:



Moderate involutional changes. The study is limited by motion but no acute 

focal finding when compared to July 19, 2020



Please note that all CT scans at this facility use dose modulation, 

iterative reconstruction, and/or weight-based dosing when appropriate to 

reduce radiation dose to as low as reasonably achievable.



Dictated by Clarence Reilly MD @ Nov 6 2020  8:36AM



Signed by Dr. Clarence Reilly @ Nov 6 2020  8:39AM

## 2020-11-06 NOTE — EDM.PDOC
ED HPI GENERAL MEDICAL PROBLEM





- General


Chief Complaint: Gastrointestinal Problem


Stated Complaint: COVID  POSSIBLE


Time Seen by Provider: 11/06/20 08:03


Source of Information: Reports: Patient, Family


History Limitations: Reports: No Limitations





- History of Present Illness


INITIAL COMMENTS - FREE TEXT/NARRATIVE: 





pt has been confused the past 2 days.  He did vomit this am and it was very 

brown in color. He looked like old blood. He does think his stools have been 

black. He is very lethrgic and falls asleep easily. According to his wife he has

been incontinent of urine and stool. He is not aware he is passing either. He 

did not start vomiting until today. He has a history of etoh abuse but acording 

to his wife he has not drank for 2-3 monthes. He has a history of cirrohosis and

espogeal varices. 


Onset: Other (pt was more confused today. )


Duration: Hour(s):


Location: Reports: Abdomen, Generalized, Other (pt has been confused. )


Associated Symptoms: Reports: Confusion, Weakness


  ** Left Upper Abdomen


Pain Score (Numeric/FACES): 2





- Related Data


                                    Allergies











Allergy/AdvReac Type Severity Reaction Status Date / Time


 


carvedilol [From Coreg] Allergy  Other Verified 07/16/20 18:37


 


gabapentin Allergy  Hives Verified 07/16/20 18:37


 


spironolactone Allergy  Hives Verified 07/16/20 18:37











Home Meds: 


                                    Home Meds





Allopurinol [Zyloprim] 300 mg PO DAILY 07/16/20 [History]


Colchicine [Colcrys] 0.6 mg PO TID PRN 07/16/20 [History]


Diltiazem HCl [Diltiazem 24Hr ER] 180 mg PO DAILY 07/16/20 [History]


Furosemide 40 mg PO DAILY 07/16/20 [History]


Magnesium Oxide 800 mg PO BID 07/16/20 [History]


Metoprolol Succinate 75 mg PO DAILY 07/16/20 [History]


Potassium Chloride 40 meq PO BID 07/16/20 [History]


Sertraline [Zoloft] 50 mg PO DAILY 07/16/20 [History]


hydrALAZINE [Apresoline] 25 mg PO Q8H 07/16/20 [History]


traZODone 100 mg PO BEDTIME 07/16/20 [History]


Amiodarone [Cordarone] 200 mg PO DAILY 07/19/20 [History]


Aspirin [Halfprin] 81 mg PO DAILY 07/19/20 [History]


Baclofen 10 mg PO TID PRN 07/19/20 [History]


Folic Acid 1 mg PO DAILY 07/19/20 [History]


Meclizine [Antivert] 25 mg PO DAILY PRN 07/19/20 [History]


Multivitamin [Multi-Vitamin Daily] 1 each PO DAILY 07/19/20 [History]


Nitroglycerin [Nitrostat] 0.4 mg PO Q5M PRN 07/19/20 [History]


Warfarin Sliding Scale [Coumadin Sliding Scale] 5 mg PO WEEKLY 07/19/20 

[History]


Warfarin Sodium [Jantoven] 7.5 mg PO DAILY 07/19/20 [History]


metFORMIN HCl [Metformin HCl] 1,000 mg PO BID 07/19/20 [History]


ondansetron HCL [Zofran] 4 mg PO Q8H PRN 07/19/20 [History]


Fluticasone/Vilanterol [Breo Ellipta 100-25 MCG Inhalation Kit] 2 inhalation INH

DAILY 07/21/20 [History]


Acetaminophen [Tylenol] 1 - 2 tab PO Q4H 11/06/20 [History]


Gabapentin [Neurontin] 300 mg PO BID 11/06/20 [History]


Sennosides [Senna] 8.6 mg PO DAILY 11/06/20 [History]


atorvaSTATin [Lipitor] 10 mg PO BEDTIME 11/06/20 [History]











Past Medical History


HEENT History: Reports: Other (See Below)


Other HEENT History: esophageal varices


Cardiovascular History: Reports: Afib, Heart Failure


Psychiatric History: Reports: Addiction


Endocrine/Metabolic History: Reports: Diabetes, Type II





- Infectious Disease History


Infectious Disease History: Reports: Chicken Pox





- Past Surgical History


GI Surgical History: Reports: Cholecystectomy





Social & Family History





- Family History


Family Medical History: Unobtainable





- Caffeine Use


Caffeine Use: Reports: Soda


Caffeine Use Comment: unable to obtain





ED ROS GENERAL





- Review of Systems


Review Of Systems: See Below


Constitutional: Reports: Malaise, Weakness, Diaphoresis


HEENT: Reports: No Symptoms


Respiratory: Reports: Shortness of Breath


Cardiovascular: Reports: No Symptoms


GI/Abdominal: Reports: Abdominal Pain, Black Stool, Hematemesis, Nausea, 

Vomiting


: Reports: No Symptoms


Musculoskeletal: Reports: No Symptoms


Skin: Reports: No Symptoms


Neurological: Reports: Confusion, Other (incontinent of stool and urine. )


Psychiatric: Reports: Depression





ED EXAM, GI/ABD





- Physical Exam


Exam: See Below


Text/Narrative:: 





pt is having vomioting this am and it appears to be coffee ground-- dark. The pt

 states he is having black stools on and off. According to his wife he is 

incontinent of stool and urine  which is new foer him. He has been quite 

confused for the last 2 days. 


Exam Limited By: No Limitations


General Appearance: Alert, Anxious, Mild Distress, Other (pupils are equal an 

reactive. )


Ears: Normal Canal, Normal TMs


Throat/Mouth: Normal Inspection


Head: Atraumatic


Neck: Normal Inspection


Respiratory/Chest: No Respiratory Distress


Cardiovascular: Regular Rate, Rhythm, Tachycardia, Irregularly Irregular, Other 

(pt has a long history of atrial fib. )


GI/Abdominal Exam: Soft, Tender, Other (pt has mild tenderness in the upper 

abdoman. )


 (Male) Exam: Deferred


Rectal (Males) Exam: Deferred


Back Exam: Normal Inspection


Extremities: Other (pt has slight swelling but not more thjan usual. )


Neurological: Alert, Confused, Disoriented


Psychiatric: Anxious





Course





- Vital Signs


Last Recorded V/S: 


                                Last Vital Signs











Temp  35.2 C L  11/06/20 07:21


 


Pulse  137 H  11/06/20 08:30


 


Resp  16   11/06/20 07:21


 


BP  172/119 H  11/06/20 08:30


 


Pulse Ox  96   11/06/20 07:21














- Orders/Labs/Meds


Orders: 


                               Active Orders 24 hr











 Category Date Time Status


 


 Cardiac Monitoring [RC] .As Directed Care  11/06/20 07:17 Active


 


 EKG Documentation Completion [RC] ASDIRECTED Care  11/06/20 07:17 Active


 


 Chest 1V Frontal [CR] Stat Exams  11/06/20 08:01 Taken


 


 CULTURE BLOOD [BC] Urgent Lab  11/06/20 08:30 Received


 


 CULTURE BLOOD [BC] Urgent Lab  11/06/20 08:30 Received


 


 DD [D-DIMER QUANTITATIVE] [COAG] Stat Lab  11/06/20 08:30 Ordered


 


 DRUG SCREEN, URINE [URCHEM] Stat Lab  11/06/20 09:03 Ordered


 


 TROPONIN I [CHEM] Stat Lab  11/06/20 08:52 Ordered


 


 TYPE AND SCREEN [BBK] Stat Lab  11/06/20 07:37 Ordered


 


 Sodium Chloride 0.9% [Normal Saline] 1,000 ml Med  11/06/20 07:45 Active





 IV ASDIRECTED   


 


 Blood Culture x2 Reflex Set [OM.PC] Urgent Oth  11/06/20 08:07 Ordered


 


 EKG 12 Lead [EK] Routine Ther  11/06/20 07:17 Ordered








                                Medication Orders





Sodium Chloride (Normal Saline)  1,000 mls @ 250 mls/hr IV ASDIRECTED JHON


   Last Admin: 11/06/20 08:58  Dose: 250 mls/hr


   Documented by: LENNY








Labs: 


                                Laboratory Tests











  11/06/20 11/06/20 11/06/20 Range/Units





  07:30 07:30 07:30 


 


WBC  8.2    (4.5-11.0)  K/uL


 


RBC  5.13    (4.30-5.90)  M/uL


 


Hgb  14.1    (12.0-15.0)  g/dL


 


Hct  46.1    (40.0-54.0)  %


 


MCV  90    (80-98)  fL


 


MCH  28    (27-31)  pg


 


MCHC  31 L    (32-36)  %


 


Plt Count  176    (150-400)  K/uL


 


Neut % (Auto)  87 H    (36-66)  %


 


Lymph % (Auto)  5 L    (24-44)  %


 


Mono % (Auto)  7 H    (2-6)  %


 


Eos % (Auto)  0 L    (2-4)  %


 


Baso % (Auto)  1    (0-1)  %


 


PT    17.3 H  (9.5-12.0)  sec


 


INR    1.60 H  (0.80-1.20)  


 


Puncture Site     


 


ABG pH     (7.350-7.450)  


 


ABG pCO2     (35.0-42.0)  mmHg


 


ABG pO2     (75.0-100.0)  mmHg


 


ABG HCO3     (22.0-26.0)  mmol/L


 


ABG Total CO2     (23.0-27.0)  mmol/L


 


ABG O2 Saturation     (95.0-98.0)  %


 


ABG O2 Content     (15.0-23.0)  %vol


 


ABG Base Excess     mm/L


 


ABG Hemoglobin     (13.5-18.0)  g/dL


 


ABG Oxyhemoglobin     %


 


ABG Carboxyhemoglobin     (0.0-1.6)  %


 


ABG Methemoglobin     %


 


Stefano Test     


 


O2 Delivery Device     


 


Sodium   138 L   (140-148)  mmol/L


 


Potassium   3.7   (3.6-5.2)  mmol/L


 


Chloride   98 L   (100-108)  mmol/L


 


Carbon Dioxide   30   (21-32)  mmol/L


 


Anion Gap   13.7   (5.0-14.0)  mmol/L


 


BUN   19 H   (7-18)  mg/dL


 


Creatinine   1.4 H   (0.8-1.3)  mg/dL


 


Est Cr Clr Drug Dosing   50.89   mL/min


 


Estimated GFR (MDRD)   51 L   (>60)  


 


Glucose   236 H   ()  mg/dL


 


Lactic Acid     (0.4-2.0)  mmol/L


 


Calcium   8.7   (8.5-10.1)  mg/dL


 


Total Bilirubin   1.0  D   (0.2-1.0)  mg/dL


 


AST   42 H   (15-37)  U/L


 


ALT   27   (12-78)  U/L


 


Alkaline Phosphatase   146 H   ()  U/L


 


Ammonia     (11-32)  mmol/L


 


NT-Pro-B Natriuret Pep     (5-125)  pg/mL


 


Total Protein   8.2   (6.4-8.2)  g/dL


 


Albumin   3.0 L   (3.4-5.0)  g/dL


 


Globulin   5.2 H   (2.3-3.5)  g/dL


 


Albumin/Globulin Ratio   0.6 L   (1.2-2.2)  


 


Urine Color     (YELLOW)  


 


Urine Appearance     (CLEAR)  


 


Urine pH     (5.0-8.0)  


 


Ur Specific Gravity     (1.008-1.030)  


 


Urine Protein     (NEGATIVE)  mg/dL


 


Urine Glucose (UA)     (NEGATIVE)  mg/dL


 


Urine Ketones     (NEGATIVE)  mg/dL


 


Urine Occult Blood     (NEGATIVE)  


 


Urine Nitrite     (NEGATIVE)  


 


Urine Bilirubin     (NEGATIVE)  


 


Urine Urobilinogen     (0.2-1.0)  EU/dL


 


Ur Leukocyte Esterase     (NEGATIVE)  


 


Urine RBC     (0-5)  


 


Urine WBC     (0-5)  


 


Ur Epithelial Cells     


 


Urine Bacteria     


 


Ethyl Alcohol     mg/dL


 


SARS-CoV-2 RNA (SIMA)     (NEGATIVE)  














  11/06/20 11/06/20 11/06/20 Range/Units





  07:30 07:30 07:30 


 


WBC     (4.5-11.0)  K/uL


 


RBC     (4.30-5.90)  M/uL


 


Hgb     (12.0-15.0)  g/dL


 


Hct     (40.0-54.0)  %


 


MCV     (80-98)  fL


 


MCH     (27-31)  pg


 


MCHC     (32-36)  %


 


Plt Count     (150-400)  K/uL


 


Neut % (Auto)     (36-66)  %


 


Lymph % (Auto)     (24-44)  %


 


Mono % (Auto)     (2-6)  %


 


Eos % (Auto)     (2-4)  %


 


Baso % (Auto)     (0-1)  %


 


PT     (9.5-12.0)  sec


 


INR     (0.80-1.20)  


 


Puncture Site     


 


ABG pH     (7.350-7.450)  


 


ABG pCO2     (35.0-42.0)  mmHg


 


ABG pO2     (75.0-100.0)  mmHg


 


ABG HCO3     (22.0-26.0)  mmol/L


 


ABG Total CO2     (23.0-27.0)  mmol/L


 


ABG O2 Saturation     (95.0-98.0)  %


 


ABG O2 Content     (15.0-23.0)  %vol


 


ABG Base Excess     mm/L


 


ABG Hemoglobin     (13.5-18.0)  g/dL


 


ABG Oxyhemoglobin     %


 


ABG Carboxyhemoglobin     (0.0-1.6)  %


 


ABG Methemoglobin     %


 


Stefano Test     


 


O2 Delivery Device     


 


Sodium     (140-148)  mmol/L


 


Potassium     (3.6-5.2)  mmol/L


 


Chloride     (100-108)  mmol/L


 


Carbon Dioxide     (21-32)  mmol/L


 


Anion Gap     (5.0-14.0)  mmol/L


 


BUN     (7-18)  mg/dL


 


Creatinine     (0.8-1.3)  mg/dL


 


Est Cr Clr Drug Dosing     mL/min


 


Estimated GFR (MDRD)     (>60)  


 


Glucose     ()  mg/dL


 


Lactic Acid  2.9 H    (0.4-2.0)  mmol/L


 


Calcium     (8.5-10.1)  mg/dL


 


Total Bilirubin     (0.2-1.0)  mg/dL


 


AST     (15-37)  U/L


 


ALT     (12-78)  U/L


 


Alkaline Phosphatase     ()  U/L


 


Ammonia    17  (11-32)  mmol/L


 


NT-Pro-B Natriuret Pep   788 H   (5-125)  pg/mL


 


Total Protein     (6.4-8.2)  g/dL


 


Albumin     (3.4-5.0)  g/dL


 


Globulin     (2.3-3.5)  g/dL


 


Albumin/Globulin Ratio     (1.2-2.2)  


 


Urine Color     (YELLOW)  


 


Urine Appearance     (CLEAR)  


 


Urine pH     (5.0-8.0)  


 


Ur Specific Gravity     (1.008-1.030)  


 


Urine Protein     (NEGATIVE)  mg/dL


 


Urine Glucose (UA)     (NEGATIVE)  mg/dL


 


Urine Ketones     (NEGATIVE)  mg/dL


 


Urine Occult Blood     (NEGATIVE)  


 


Urine Nitrite     (NEGATIVE)  


 


Urine Bilirubin     (NEGATIVE)  


 


Urine Urobilinogen     (0.2-1.0)  EU/dL


 


Ur Leukocyte Esterase     (NEGATIVE)  


 


Urine RBC     (0-5)  


 


Urine WBC     (0-5)  


 


Ur Epithelial Cells     


 


Urine Bacteria     


 


Ethyl Alcohol     mg/dL


 


SARS-CoV-2 RNA (SIMA)     (NEGATIVE)  














  11/06/20 11/06/20 11/06/20 Range/Units





  07:30 07:33 07:51 


 


WBC     (4.5-11.0)  K/uL


 


RBC     (4.30-5.90)  M/uL


 


Hgb     (12.0-15.0)  g/dL


 


Hct     (40.0-54.0)  %


 


MCV     (80-98)  fL


 


MCH     (27-31)  pg


 


MCHC     (32-36)  %


 


Plt Count     (150-400)  K/uL


 


Neut % (Auto)     (36-66)  %


 


Lymph % (Auto)     (24-44)  %


 


Mono % (Auto)     (2-6)  %


 


Eos % (Auto)     (2-4)  %


 


Baso % (Auto)     (0-1)  %


 


PT     (9.5-12.0)  sec


 


INR     (0.80-1.20)  


 


Puncture Site   Rt radial   


 


ABG pH   7.464 H   (7.350-7.450)  


 


ABG pCO2   39.1   (35.0-42.0)  mmHg


 


ABG pO2   79.4   (75.0-100.0)  mmHg


 


ABG HCO3   27.7 H   (22.0-26.0)  mmol/L


 


ABG Total CO2   23.8   (23.0-27.0)  mmol/L


 


ABG O2 Saturation   96.1   (95.0-98.0)  %


 


ABG O2 Content   19.3   (15.0-23.0)  %vol


 


ABG Base Excess   4.2   mm/L


 


ABG Hemoglobin   14.7   (13.5-18.0)  g/dL


 


ABG Oxyhemoglobin   93.5   %


 


ABG Carboxyhemoglobin   1.9 H   (0.0-1.6)  %


 


ABG Methemoglobin   0.8   %


 


Stefano Test   Passed   


 


O2 Delivery Device   Room air   


 


Sodium     (140-148)  mmol/L


 


Potassium     (3.6-5.2)  mmol/L


 


Chloride     (100-108)  mmol/L


 


Carbon Dioxide     (21-32)  mmol/L


 


Anion Gap     (5.0-14.0)  mmol/L


 


BUN     (7-18)  mg/dL


 


Creatinine     (0.8-1.3)  mg/dL


 


Est Cr Clr Drug Dosing     mL/min


 


Estimated GFR (MDRD)     (>60)  


 


Glucose     ()  mg/dL


 


Lactic Acid     (0.4-2.0)  mmol/L


 


Calcium     (8.5-10.1)  mg/dL


 


Total Bilirubin     (0.2-1.0)  mg/dL


 


AST     (15-37)  U/L


 


ALT     (12-78)  U/L


 


Alkaline Phosphatase     ()  U/L


 


Ammonia     (11-32)  mmol/L


 


NT-Pro-B Natriuret Pep     (5-125)  pg/mL


 


Total Protein     (6.4-8.2)  g/dL


 


Albumin     (3.4-5.0)  g/dL


 


Globulin     (2.3-3.5)  g/dL


 


Albumin/Globulin Ratio     (1.2-2.2)  


 


Urine Color    Yellow  (YELLOW)  


 


Urine Appearance    Clear  (CLEAR)  


 


Urine pH    7.0  (5.0-8.0)  


 


Ur Specific Gravity    1.025  (1.008-1.030)  


 


Urine Protein    >=300 H  (NEGATIVE)  mg/dL


 


Urine Glucose (UA)    100 H  (NEGATIVE)  mg/dL


 


Urine Ketones    Negative  (NEGATIVE)  mg/dL


 


Urine Occult Blood    Small H  (NEGATIVE)  


 


Urine Nitrite    Negative  (NEGATIVE)  


 


Urine Bilirubin    Small H  (NEGATIVE)  


 


Urine Urobilinogen    2.0 H  (0.2-1.0)  EU/dL


 


Ur Leukocyte Esterase    Negative  (NEGATIVE)  


 


Urine RBC    5-10 H  (0-5)  


 


Urine WBC    0-5  (0-5)  


 


Ur Epithelial Cells    Few  


 


Urine Bacteria    Few  


 


Ethyl Alcohol  < 3    mg/dL


 


SARS-CoV-2 RNA (SIMA)     (NEGATIVE)  














  11/06/20 Range/Units





  08:02 


 


WBC   (4.5-11.0)  K/uL


 


RBC   (4.30-5.90)  M/uL


 


Hgb   (12.0-15.0)  g/dL


 


Hct   (40.0-54.0)  %


 


MCV   (80-98)  fL


 


MCH   (27-31)  pg


 


MCHC   (32-36)  %


 


Plt Count   (150-400)  K/uL


 


Neut % (Auto)   (36-66)  %


 


Lymph % (Auto)   (24-44)  %


 


Mono % (Auto)   (2-6)  %


 


Eos % (Auto)   (2-4)  %


 


Baso % (Auto)   (0-1)  %


 


PT   (9.5-12.0)  sec


 


INR   (0.80-1.20)  


 


Puncture Site   


 


ABG pH   (7.350-7.450)  


 


ABG pCO2   (35.0-42.0)  mmHg


 


ABG pO2   (75.0-100.0)  mmHg


 


ABG HCO3   (22.0-26.0)  mmol/L


 


ABG Total CO2   (23.0-27.0)  mmol/L


 


ABG O2 Saturation   (95.0-98.0)  %


 


ABG O2 Content   (15.0-23.0)  %vol


 


ABG Base Excess   mm/L


 


ABG Hemoglobin   (13.5-18.0)  g/dL


 


ABG Oxyhemoglobin   %


 


ABG Carboxyhemoglobin   (0.0-1.6)  %


 


ABG Methemoglobin   %


 


Stefano Test   


 


O2 Delivery Device   


 


Sodium   (140-148)  mmol/L


 


Potassium   (3.6-5.2)  mmol/L


 


Chloride   (100-108)  mmol/L


 


Carbon Dioxide   (21-32)  mmol/L


 


Anion Gap   (5.0-14.0)  mmol/L


 


BUN   (7-18)  mg/dL


 


Creatinine   (0.8-1.3)  mg/dL


 


Est Cr Clr Drug Dosing   mL/min


 


Estimated GFR (MDRD)   (>60)  


 


Glucose   ()  mg/dL


 


Lactic Acid   (0.4-2.0)  mmol/L


 


Calcium   (8.5-10.1)  mg/dL


 


Total Bilirubin   (0.2-1.0)  mg/dL


 


AST   (15-37)  U/L


 


ALT   (12-78)  U/L


 


Alkaline Phosphatase   ()  U/L


 


Ammonia   (11-32)  mmol/L


 


NT-Pro-B Natriuret Pep   (5-125)  pg/mL


 


Total Protein   (6.4-8.2)  g/dL


 


Albumin   (3.4-5.0)  g/dL


 


Globulin   (2.3-3.5)  g/dL


 


Albumin/Globulin Ratio   (1.2-2.2)  


 


Urine Color   (YELLOW)  


 


Urine Appearance   (CLEAR)  


 


Urine pH   (5.0-8.0)  


 


Ur Specific Gravity   (1.008-1.030)  


 


Urine Protein   (NEGATIVE)  mg/dL


 


Urine Glucose (UA)   (NEGATIVE)  mg/dL


 


Urine Ketones   (NEGATIVE)  mg/dL


 


Urine Occult Blood   (NEGATIVE)  


 


Urine Nitrite   (NEGATIVE)  


 


Urine Bilirubin   (NEGATIVE)  


 


Urine Urobilinogen   (0.2-1.0)  EU/dL


 


Ur Leukocyte Esterase   (NEGATIVE)  


 


Urine RBC   (0-5)  


 


Urine WBC   (0-5)  


 


Ur Epithelial Cells   


 


Urine Bacteria   


 


Ethyl Alcohol   mg/dL


 


SARS-CoV-2 RNA (SIMA)  Negative  (NEGATIVE)  











Meds: 


Medications











Generic Name Dose Route Start Last Admin





  Trade Name Freq  PRN Reason Stop Dose Admin


 


Sodium Chloride  1,000 mls @ 250 mls/hr  11/06/20 07:45  11/06/20 08:58





  Normal Saline  IV   250 mls/hr





  ASDIRECTED JHON   Administration














Discontinued Medications














Generic Name Dose Route Start Last Admin





  Trade Name Freq  PRN Reason Stop Dose Admin


 


Ondansetron HCl  4 mg  11/06/20 08:40  11/06/20 08:59





  Zofran  IVPUSH  11/06/20 08:41  4 mg





  ONETIME ONE   Administration


 


Pantoprazole Sodium  40 mg  11/06/20 07:47  11/06/20 08:52





  Protonix Iv***  IVPUSH  11/06/20 07:48  40 mg





  ONETIME ONE   Administration














- Re-Assessments/Exams


Free Text/Narrative Re-Assessment/Exam: 





11/06/20 09:06


because of the confusion he had a cat scan of the head which was neg.  His chest

 xray did not look different. He was covid neg. 


11/06/20 09:18








Departure





- Departure


Time of Disposition: 09:18


Disposition: Admitted As Inpatient 66


Condition: Fair


Clinical Impression: 


 Intoxication by drug, Dehydration, Confusion, Gastrointestinal irritation








- Discharge Information


Referrals: 


PCP,None [Primary Care Provider] - 


Forms:  ED Department Discharge


Care Plan Goals: 


admit to Dr Chavira. 





Sepsis Event Note (ED)





- Evaluation


Sepsis Screening Result: No Definite Risk





- Focused Exam


Vital Signs: 


                                   Vital Signs











  Temp Pulse Resp BP Pulse Ox


 


 11/06/20 08:30   137 H   172/119 H 


 


 11/06/20 07:30   63   181/107 H 


 


 11/06/20 07:21  35.2 C L  126 H  16  191/100 H  96


 


 11/06/20 07:05  35.2 C L  82  16  191/100 H  97














- My Orders


Last 24 Hours: 


My Active Orders





11/06/20 07:17


Cardiac Monitoring [RC] .As Directed 


EKG Documentation Completion [RC] ASDIRECTED 


EKG 12 Lead [EK] Routine 





11/06/20 07:37


TYPE AND SCREEN [BBK] Stat 





11/06/20 07:45


Sodium Chloride 0.9% [Normal Saline] 1,000 ml IV ASDIRECTED 





11/06/20 08:01


Chest 1V Frontal [CR] Stat 





11/06/20 08:07


Blood Culture x2 Reflex Set [OM.PC] Urgent 





11/06/20 08:30


CULTURE BLOOD [BC] Urgent 


CULTURE BLOOD [BC] Urgent 


DD [D-DIMER QUANTITATIVE] [COAG] Stat 





11/06/20 08:52


TROPONIN I [CHEM] Stat 





11/06/20 09:03


DRUG SCREEN, URINE [URCHEM] Stat 














- Assessment/Plan


Last 24 Hours: 


My Active Orders





11/06/20 07:17


Cardiac Monitoring [RC] .As Directed 


EKG Documentation Completion [RC] ASDIRECTED 


EKG 12 Lead [EK] Routine 





11/06/20 07:37


TYPE AND SCREEN [BBK] Stat 





11/06/20 07:45


Sodium Chloride 0.9% [Normal Saline] 1,000 ml IV ASDIRECTED 





11/06/20 08:01


Chest 1V Frontal [CR] Stat 





11/06/20 08:07


Blood Culture x2 Reflex Set [OM.PC] Urgent 





11/06/20 08:30


CULTURE BLOOD [BC] Urgent 


CULTURE BLOOD [BC] Urgent 


DD [D-DIMER QUANTITATIVE] [COAG] Stat 





11/06/20 08:52


TROPONIN I [CHEM] Stat 





11/06/20 09:03


DRUG SCREEN, URINE [URCHEM] Stat

## 2020-11-06 NOTE — CR
CHEST: Portable  11/6/2020 at 11:00 AM

 

CLINICAL HISTORY:SOB

 

COMPARISON:10/8/2020

 

FINDINGS:  The heart is enlarged. Pulmonary vascularity is normal. There is no

infiltrate effusion or pneumothorax. There is some streaky density in the left

infrahilar region similar to prior study likely related to scarring. There has

been previous granulomatous exposure. There are atherosclerotic changes in the

aorta.

 

Impression: Cardiomegaly

 

No acute cardiopulmonary process.

## 2020-11-06 NOTE — PCM.HP.2
H&P History of Present Illness





- General


Date of Service: 11/06/20


Admit Problem/Dx: 


                           Admission Diagnosis/Problem





Admission Diagnosis/Problem      Encephalopathy








Source of Information: Other (ED signout).  No: Patient, Family





- History of Present Illness


Initial Comments - Free Text/Narative: 





Jose Levine is a 64 yo male admitted to [facility] from the ED on 6 November 2020 for 2 day history of encephalopathy.  The paitent, per the report from the 

wife to the ER, has been incontinent of stool, urine and has been minimally 

responsive for that time.  Per history the patient has myriad medical problems 

including prior alcoholism from which is is reportedly abstinent x 3 mos.  The 

patient's BAL at < 0.01 at time of admission confirms this, at least for the 

past 48 hours or so.  The patient has a PCA, his brother, who is supposedly 

managing meds and routine cares.  However it is unclear whether the patient has 

been taking the meds as prescribed.  His wife notes that he has, over the past 

few days, been questionably compliant with medication.





The patient for his part is arousable but otherwise unable to offer ANY 

meaningful history on interview.  Family was not available at that time either. 







RN notes that the patient is in atrial fibrillation with RVR.  He is also 

nauseated.





ED suspects that the patient has been goofing up a number of his medications 

which may have triggered the episode of encephalopathy.  Admission to ICU is 

requested for evaluation and management of the encephalopathy


  ** Left Upper Abdomen


Pain Score (Numeric/FACES): 2





- Related Data


Allergies/Adverse Reactions: 


                                    Allergies











Allergy/AdvReac Type Severity Reaction Status Date / Time


 


carvedilol [From Coreg] Allergy  Other Verified 07/16/20 18:37


 


spironolactone Allergy  Hives Verified 07/16/20 18:37











Home Medications: 


                                    Home Meds





Allopurinol [Zyloprim] 300 mg PO DAILY 07/16/20 [History]


Colchicine [Colcrys] 0.6 mg PO TID PRN 07/16/20 [History]


Diltiazem HCl [Diltiazem 24Hr ER] 180 mg PO DAILY 07/16/20 [History]


Furosemide 40 mg PO DAILY 07/16/20 [History]


Magnesium Oxide 800 mg PO BID 07/16/20 [History]


Metoprolol Succinate 75 mg PO DAILY 07/16/20 [History]


Sertraline [Zoloft] 50 mg PO DAILY 07/16/20 [History]


hydrALAZINE [Apresoline] 25 mg PO Q8H 07/16/20 [History]


traZODone 100 mg PO BEDTIME 07/16/20 [History]


Amiodarone [Cordarone] 200 mg PO DAILY 07/19/20 [History]


Baclofen 10 mg PO TID PRN 07/19/20 [History]


Folic Acid 1 mg PO DAILY 07/19/20 [History]


Multivitamin [Multi-Vitamin Daily] 1 each PO DAILY 07/19/20 [History]


Nitroglycerin [Nitrostat] 0.4 mg PO Q5M PRN 07/19/20 [History]


Warfarin Sliding Scale [Coumadin Sliding Scale] 5 mg PO ASDIRECTED 07/19/20 

[History]


Warfarin Sodium [Jantoven] 7.5 mg PO ASDIRECTED 07/19/20 [History]


metFORMIN HCl [Metformin HCl] 1,000 mg PO BID 07/19/20 [History]


ondansetron HCL [Zofran] 4 mg PO Q8H PRN 07/19/20 [History]


Fluticasone/Vilanterol [Breo Ellipta 100-25 MCG Inhalation Kit] 2 inhalation INH

DAILY 07/21/20 [History]


Acetaminophen [Tylenol] 1 - 2 tab PO Q4H PRN 11/06/20 [History]


Gabapentin [Neurontin] 300 mg PO BID 11/06/20 [History]


atorvaSTATin [Lipitor] 10 mg PO BEDTIME 11/06/20 [History]











Past Medical History


HEENT History: Reports: Other (See Below)


Other HEENT History: esophageal varices


Cardiovascular History: Reports: Afib, Heart Failure


Gastrointestinal History: Reports: Other (See Below)


Other Gastrointestinal History: esophageal varices (one banded on 11/06/2020)


Psychiatric History: Reports: Addiction, Other (See Below)


Other Psychiatric History: past ETOH addiction. Wife states has not drank for 

2.5 months


Endocrine/Metabolic History: Reports: Diabetes, Type II, Obesity/BMI 30+





- Infectious Disease History


Infectious Disease History: Reports: Chicken Pox





- Past Surgical History


GI Surgical History: Reports: Cholecystectomy





Social & Family History





- Family History


Family Medical History: Unobtainable





- Tobacco Use


Tobacco Use Status *Q: Never Tobacco User


Second Hand Smoke Exposure: No





- Caffeine Use


Caffeine Use: Reports: Soda


Caffeine Use Comment: unable to obtain





- Recreational Drug Use


Recreational Drug Use: No





H&P Review of Systems





- Review of Systems:


Review Of Systems: Comprehensive ROS is negative, except as noted in HPI.





Exam





- Exam


Exam: See Below





- Vital Signs


Vital Signs: 


                                Last Vital Signs











Temp  96.6 F L  11/06/20 11:59


 


Pulse  93   11/06/20 11:59


 


Resp  10 L  11/06/20 11:59


 


BP  163/103 H  11/06/20 11:59


 


Pulse Ox  99   11/06/20 11:59











Weight: 432 lb 1.696 oz





- Exam


Quality Assessment: Supplemental Oxygen, DVT Prophylaxis.  No: Central 

Line/PICC, Urinary Catheter


General: Alert, Lethargic


HEENT: PERRLA


Neck: Supple, Trachea Midline


Lungs: Clear to Auscultation, Normal Respiratory Effort


Cardiovascular: Normal S1, Normal S2, Irregular Rhythm, Tachycardia.  No: 

Systolic Murmur, Diastolic Murmur


GI/Abdominal Exam: Normal Bowel Sounds, Soft, Non-Tender, Other (Morbidly obese)


Back Exam: Normal Inspection


Extremities: Normal Inspection, Non-Tender, Other (serious edema)


Neurological: Reflexes Equal Bilateral


DTR: 2+: Patella (L), Patella (R)


Psychiatric: No: Alert, Normal Affect, Normal Mood





- Patient Data


Lab Results Last 24 hrs: 


                         Laboratory Results - last 24 hr











  11/06/20 11/06/20 11/06/20 Range/Units





  07:30 07:30 07:30 


 


WBC  8.2    (4.5-11.0)  K/uL


 


RBC  5.13    (4.30-5.90)  M/uL


 


Hgb  14.1    (12.0-15.0)  g/dL


 


Hct  46.1    (40.0-54.0)  %


 


MCV  90    (80-98)  fL


 


MCH  28    (27-31)  pg


 


MCHC  31 L    (32-36)  %


 


Plt Count  176    (150-400)  K/uL


 


Neut % (Auto)  87 H    (36-66)  %


 


Lymph % (Auto)  5 L    (24-44)  %


 


Mono % (Auto)  7 H    (2-6)  %


 


Eos % (Auto)  0 L    (2-4)  %


 


Baso % (Auto)  1    (0-1)  %


 


PT    17.3 H  (9.5-12.0)  sec


 


INR    1.60 H  (0.80-1.20)  


 


D-Dimer, Quantitative     (0.0-500.0)  ng/mL


 


Puncture Site     


 


ABG pH     (7.350-7.450)  


 


ABG pCO2     (35.0-42.0)  mmHg


 


ABG pO2     (75.0-100.0)  mmHg


 


ABG HCO3     (22.0-26.0)  mmol/L


 


ABG Total CO2     (23.0-27.0)  mmol/L


 


ABG O2 Saturation     (95.0-98.0)  %


 


ABG O2 Content     (15.0-23.0)  %vol


 


ABG Base Excess     mm/L


 


ABG Hemoglobin     (13.5-18.0)  g/dL


 


ABG Oxyhemoglobin     %


 


ABG Carboxyhemoglobin     (0.0-1.6)  %


 


ABG Methemoglobin     %


 


Stefano Test     


 


O2 Delivery Device     


 


Sodium   138 L   (140-148)  mmol/L


 


Potassium   3.7   (3.6-5.2)  mmol/L


 


Chloride   98 L   (100-108)  mmol/L


 


Carbon Dioxide   30   (21-32)  mmol/L


 


Anion Gap   13.7   (5.0-14.0)  mmol/L


 


BUN   19 H   (7-18)  mg/dL


 


Creatinine   1.4 H   (0.8-1.3)  mg/dL


 


Est Cr Clr Drug Dosing   50.89   mL/min


 


Estimated GFR (MDRD)   51 L   (>60)  


 


Glucose   236 H   ()  mg/dL


 


Lactic Acid     (0.4-2.0)  mmol/L


 


Calcium   8.7   (8.5-10.1)  mg/dL


 


Magnesium     (1.8-2.4)  mg/dL


 


Total Bilirubin   1.0  D   (0.2-1.0)  mg/dL


 


AST   42 H   (15-37)  U/L


 


ALT   27   (12-78)  U/L


 


Alkaline Phosphatase   146 H   ()  U/L


 


Ammonia     (11-32)  mmol/L


 


Troponin I     (0.000-0.056)  ng/mL


 


NT-Pro-B Natriuret Pep     (5-125)  pg/mL


 


Total Protein   8.2   (6.4-8.2)  g/dL


 


Albumin   3.0 L   (3.4-5.0)  g/dL


 


Globulin   5.2 H   (2.3-3.5)  g/dL


 


Albumin/Globulin Ratio   0.6 L   (1.2-2.2)  


 


Urine Color     (YELLOW)  


 


Urine Appearance     (CLEAR)  


 


Urine pH     (5.0-8.0)  


 


Ur Specific Gravity     (1.008-1.030)  


 


Urine Protein     (NEGATIVE)  mg/dL


 


Urine Glucose (UA)     (NEGATIVE)  mg/dL


 


Urine Ketones     (NEGATIVE)  mg/dL


 


Urine Occult Blood     (NEGATIVE)  


 


Urine Nitrite     (NEGATIVE)  


 


Urine Bilirubin     (NEGATIVE)  


 


Urine Urobilinogen     (0.2-1.0)  EU/dL


 


Ur Leukocyte Esterase     (NEGATIVE)  


 


Urine RBC     (0-5)  


 


Urine WBC     (0-5)  


 


Ur Epithelial Cells     


 


Urine Bacteria     


 


Urine Opiates Screen     (NEGATIVE)  


 


Ur Oxycodone Screen     (NEGATIVE)  


 


Urine Methadone Screen     (NEGATIVE)  


 


Ur Propoxyphene Screen     (NEGATIVE)  


 


Ur Barbiturates Screen     (NEGATIVE)  


 


Ur Tricyclics Screen     (NEGATIVE)  


 


Ur Phencyclidine Scrn     (NEGATIVE)  


 


Ur Amphetamine Screen     (NEGATIVE)  


 


U Methamphetamines Scrn     (NEGATIVE)  


 


Urine MDMA Screen     (NEGATIVE)  


 


U Benzodiazepines Scrn     (NEGATIVE)  


 


U Cocaine Metab Screen     (NEGATIVE)  


 


U Marijuana (THC) Screen     (NEGATIVE)  


 


Ethyl Alcohol     mg/dL


 


SARS-CoV-2 RNA (SIMA)     (NEGATIVE)  


 


Blood Type     


 


Gel Antibody Screen     














  11/06/20 11/06/20 11/06/20 Range/Units





  07:30 07:30 07:30 


 


WBC     (4.5-11.0)  K/uL


 


RBC     (4.30-5.90)  M/uL


 


Hgb     (12.0-15.0)  g/dL


 


Hct     (40.0-54.0)  %


 


MCV     (80-98)  fL


 


MCH     (27-31)  pg


 


MCHC     (32-36)  %


 


Plt Count     (150-400)  K/uL


 


Neut % (Auto)     (36-66)  %


 


Lymph % (Auto)     (24-44)  %


 


Mono % (Auto)     (2-6)  %


 


Eos % (Auto)     (2-4)  %


 


Baso % (Auto)     (0-1)  %


 


PT     (9.5-12.0)  sec


 


INR     (0.80-1.20)  


 


D-Dimer, Quantitative     (0.0-500.0)  ng/mL


 


Puncture Site     


 


ABG pH     (7.350-7.450)  


 


ABG pCO2     (35.0-42.0)  mmHg


 


ABG pO2     (75.0-100.0)  mmHg


 


ABG HCO3     (22.0-26.0)  mmol/L


 


ABG Total CO2     (23.0-27.0)  mmol/L


 


ABG O2 Saturation     (95.0-98.0)  %


 


ABG O2 Content     (15.0-23.0)  %vol


 


ABG Base Excess     mm/L


 


ABG Hemoglobin     (13.5-18.0)  g/dL


 


ABG Oxyhemoglobin     %


 


ABG Carboxyhemoglobin     (0.0-1.6)  %


 


ABG Methemoglobin     %


 


Stefano Test     


 


O2 Delivery Device     


 


Sodium     (140-148)  mmol/L


 


Potassium     (3.6-5.2)  mmol/L


 


Chloride     (100-108)  mmol/L


 


Carbon Dioxide     (21-32)  mmol/L


 


Anion Gap     (5.0-14.0)  mmol/L


 


BUN     (7-18)  mg/dL


 


Creatinine     (0.8-1.3)  mg/dL


 


Est Cr Clr Drug Dosing     mL/min


 


Estimated GFR (MDRD)     (>60)  


 


Glucose     ()  mg/dL


 


Lactic Acid  2.9 H    (0.4-2.0)  mmol/L


 


Calcium     (8.5-10.1)  mg/dL


 


Magnesium     (1.8-2.4)  mg/dL


 


Total Bilirubin     (0.2-1.0)  mg/dL


 


AST     (15-37)  U/L


 


ALT     (12-78)  U/L


 


Alkaline Phosphatase     ()  U/L


 


Ammonia     (11-32)  mmol/L


 


Troponin I     (0.000-0.056)  ng/mL


 


NT-Pro-B Natriuret Pep   788 H   (5-125)  pg/mL


 


Total Protein     (6.4-8.2)  g/dL


 


Albumin     (3.4-5.0)  g/dL


 


Globulin     (2.3-3.5)  g/dL


 


Albumin/Globulin Ratio     (1.2-2.2)  


 


Urine Color     (YELLOW)  


 


Urine Appearance     (CLEAR)  


 


Urine pH     (5.0-8.0)  


 


Ur Specific Gravity     (1.008-1.030)  


 


Urine Protein     (NEGATIVE)  mg/dL


 


Urine Glucose (UA)     (NEGATIVE)  mg/dL


 


Urine Ketones     (NEGATIVE)  mg/dL


 


Urine Occult Blood     (NEGATIVE)  


 


Urine Nitrite     (NEGATIVE)  


 


Urine Bilirubin     (NEGATIVE)  


 


Urine Urobilinogen     (0.2-1.0)  EU/dL


 


Ur Leukocyte Esterase     (NEGATIVE)  


 


Urine RBC     (0-5)  


 


Urine WBC     (0-5)  


 


Ur Epithelial Cells     


 


Urine Bacteria     


 


Urine Opiates Screen     (NEGATIVE)  


 


Ur Oxycodone Screen     (NEGATIVE)  


 


Urine Methadone Screen     (NEGATIVE)  


 


Ur Propoxyphene Screen     (NEGATIVE)  


 


Ur Barbiturates Screen     (NEGATIVE)  


 


Ur Tricyclics Screen     (NEGATIVE)  


 


Ur Phencyclidine Scrn     (NEGATIVE)  


 


Ur Amphetamine Screen     (NEGATIVE)  


 


U Methamphetamines Scrn     (NEGATIVE)  


 


Urine MDMA Screen     (NEGATIVE)  


 


U Benzodiazepines Scrn     (NEGATIVE)  


 


U Cocaine Metab Screen     (NEGATIVE)  


 


U Marijuana (THC) Screen     (NEGATIVE)  


 


Ethyl Alcohol     mg/dL


 


SARS-CoV-2 RNA (SIMA)     (NEGATIVE)  


 


Blood Type    A NEGATIVE  


 


Gel Antibody Screen    Negative  














  11/06/20 11/06/20 11/06/20 Range/Units





  07:30 07:30 07:30 


 


WBC     (4.5-11.0)  K/uL


 


RBC     (4.30-5.90)  M/uL


 


Hgb     (12.0-15.0)  g/dL


 


Hct     (40.0-54.0)  %


 


MCV     (80-98)  fL


 


MCH     (27-31)  pg


 


MCHC     (32-36)  %


 


Plt Count     (150-400)  K/uL


 


Neut % (Auto)     (36-66)  %


 


Lymph % (Auto)     (24-44)  %


 


Mono % (Auto)     (2-6)  %


 


Eos % (Auto)     (2-4)  %


 


Baso % (Auto)     (0-1)  %


 


PT     (9.5-12.0)  sec


 


INR     (0.80-1.20)  


 


D-Dimer, Quantitative     (0.0-500.0)  ng/mL


 


Puncture Site     


 


ABG pH     (7.350-7.450)  


 


ABG pCO2     (35.0-42.0)  mmHg


 


ABG pO2     (75.0-100.0)  mmHg


 


ABG HCO3     (22.0-26.0)  mmol/L


 


ABG Total CO2     (23.0-27.0)  mmol/L


 


ABG O2 Saturation     (95.0-98.0)  %


 


ABG O2 Content     (15.0-23.0)  %vol


 


ABG Base Excess     mm/L


 


ABG Hemoglobin     (13.5-18.0)  g/dL


 


ABG Oxyhemoglobin     %


 


ABG Carboxyhemoglobin     (0.0-1.6)  %


 


ABG Methemoglobin     %


 


Stefano Test     


 


O2 Delivery Device     


 


Sodium     (140-148)  mmol/L


 


Potassium     (3.6-5.2)  mmol/L


 


Chloride     (100-108)  mmol/L


 


Carbon Dioxide     (21-32)  mmol/L


 


Anion Gap     (5.0-14.0)  mmol/L


 


BUN     (7-18)  mg/dL


 


Creatinine     (0.8-1.3)  mg/dL


 


Est Cr Clr Drug Dosing     mL/min


 


Estimated GFR (MDRD)     (>60)  


 


Glucose     ()  mg/dL


 


Lactic Acid     (0.4-2.0)  mmol/L


 


Calcium     (8.5-10.1)  mg/dL


 


Magnesium    1.3 L D  (1.8-2.4)  mg/dL


 


Total Bilirubin     (0.2-1.0)  mg/dL


 


AST     (15-37)  U/L


 


ALT     (12-78)  U/L


 


Alkaline Phosphatase     ()  U/L


 


Ammonia  17    (11-32)  mmol/L


 


Troponin I     (0.000-0.056)  ng/mL


 


NT-Pro-B Natriuret Pep     (5-125)  pg/mL


 


Total Protein     (6.4-8.2)  g/dL


 


Albumin     (3.4-5.0)  g/dL


 


Globulin     (2.3-3.5)  g/dL


 


Albumin/Globulin Ratio     (1.2-2.2)  


 


Urine Color     (YELLOW)  


 


Urine Appearance     (CLEAR)  


 


Urine pH     (5.0-8.0)  


 


Ur Specific Gravity     (1.008-1.030)  


 


Urine Protein     (NEGATIVE)  mg/dL


 


Urine Glucose (UA)     (NEGATIVE)  mg/dL


 


Urine Ketones     (NEGATIVE)  mg/dL


 


Urine Occult Blood     (NEGATIVE)  


 


Urine Nitrite     (NEGATIVE)  


 


Urine Bilirubin     (NEGATIVE)  


 


Urine Urobilinogen     (0.2-1.0)  EU/dL


 


Ur Leukocyte Esterase     (NEGATIVE)  


 


Urine RBC     (0-5)  


 


Urine WBC     (0-5)  


 


Ur Epithelial Cells     


 


Urine Bacteria     


 


Urine Opiates Screen     (NEGATIVE)  


 


Ur Oxycodone Screen     (NEGATIVE)  


 


Urine Methadone Screen     (NEGATIVE)  


 


Ur Propoxyphene Screen     (NEGATIVE)  


 


Ur Barbiturates Screen     (NEGATIVE)  


 


Ur Tricyclics Screen     (NEGATIVE)  


 


Ur Phencyclidine Scrn     (NEGATIVE)  


 


Ur Amphetamine Screen     (NEGATIVE)  


 


U Methamphetamines Scrn     (NEGATIVE)  


 


Urine MDMA Screen     (NEGATIVE)  


 


U Benzodiazepines Scrn     (NEGATIVE)  


 


U Cocaine Metab Screen     (NEGATIVE)  


 


U Marijuana (THC) Screen     (NEGATIVE)  


 


Ethyl Alcohol   < 3   mg/dL


 


SARS-CoV-2 RNA (SIMA)     (NEGATIVE)  


 


Blood Type     


 


Gel Antibody Screen     














  11/06/20 11/06/20 11/06/20 Range/Units





  07:33 07:51 08:02 


 


WBC     (4.5-11.0)  K/uL


 


RBC     (4.30-5.90)  M/uL


 


Hgb     (12.0-15.0)  g/dL


 


Hct     (40.0-54.0)  %


 


MCV     (80-98)  fL


 


MCH     (27-31)  pg


 


MCHC     (32-36)  %


 


Plt Count     (150-400)  K/uL


 


Neut % (Auto)     (36-66)  %


 


Lymph % (Auto)     (24-44)  %


 


Mono % (Auto)     (2-6)  %


 


Eos % (Auto)     (2-4)  %


 


Baso % (Auto)     (0-1)  %


 


PT     (9.5-12.0)  sec


 


INR     (0.80-1.20)  


 


D-Dimer, Quantitative     (0.0-500.0)  ng/mL


 


Puncture Site  Rt radial    


 


ABG pH  7.464 H    (7.350-7.450)  


 


ABG pCO2  39.1    (35.0-42.0)  mmHg


 


ABG pO2  79.4    (75.0-100.0)  mmHg


 


ABG HCO3  27.7 H    (22.0-26.0)  mmol/L


 


ABG Total CO2  23.8    (23.0-27.0)  mmol/L


 


ABG O2 Saturation  96.1    (95.0-98.0)  %


 


ABG O2 Content  19.3    (15.0-23.0)  %vol


 


ABG Base Excess  4.2    mm/L


 


ABG Hemoglobin  14.7    (13.5-18.0)  g/dL


 


ABG Oxyhemoglobin  93.5    %


 


ABG Carboxyhemoglobin  1.9 H    (0.0-1.6)  %


 


ABG Methemoglobin  0.8    %


 


Stefano Test  Passed    


 


O2 Delivery Device  Room air    


 


Sodium     (140-148)  mmol/L


 


Potassium     (3.6-5.2)  mmol/L


 


Chloride     (100-108)  mmol/L


 


Carbon Dioxide     (21-32)  mmol/L


 


Anion Gap     (5.0-14.0)  mmol/L


 


BUN     (7-18)  mg/dL


 


Creatinine     (0.8-1.3)  mg/dL


 


Est Cr Clr Drug Dosing     mL/min


 


Estimated GFR (MDRD)     (>60)  


 


Glucose     ()  mg/dL


 


Lactic Acid     (0.4-2.0)  mmol/L


 


Calcium     (8.5-10.1)  mg/dL


 


Magnesium     (1.8-2.4)  mg/dL


 


Total Bilirubin     (0.2-1.0)  mg/dL


 


AST     (15-37)  U/L


 


ALT     (12-78)  U/L


 


Alkaline Phosphatase     ()  U/L


 


Ammonia     (11-32)  mmol/L


 


Troponin I     (0.000-0.056)  ng/mL


 


NT-Pro-B Natriuret Pep     (5-125)  pg/mL


 


Total Protein     (6.4-8.2)  g/dL


 


Albumin     (3.4-5.0)  g/dL


 


Globulin     (2.3-3.5)  g/dL


 


Albumin/Globulin Ratio     (1.2-2.2)  


 


Urine Color   Yellow   (YELLOW)  


 


Urine Appearance   Clear   (CLEAR)  


 


Urine pH   7.0   (5.0-8.0)  


 


Ur Specific Gravity   1.025   (1.008-1.030)  


 


Urine Protein   >=300 H   (NEGATIVE)  mg/dL


 


Urine Glucose (UA)   100 H   (NEGATIVE)  mg/dL


 


Urine Ketones   Negative   (NEGATIVE)  mg/dL


 


Urine Occult Blood   Small H   (NEGATIVE)  


 


Urine Nitrite   Negative   (NEGATIVE)  


 


Urine Bilirubin   Small H   (NEGATIVE)  


 


Urine Urobilinogen   2.0 H   (0.2-1.0)  EU/dL


 


Ur Leukocyte Esterase   Negative   (NEGATIVE)  


 


Urine RBC   5-10 H   (0-5)  


 


Urine WBC   0-5   (0-5)  


 


Ur Epithelial Cells   Few   


 


Urine Bacteria   Few   


 


Urine Opiates Screen     (NEGATIVE)  


 


Ur Oxycodone Screen     (NEGATIVE)  


 


Urine Methadone Screen     (NEGATIVE)  


 


Ur Propoxyphene Screen     (NEGATIVE)  


 


Ur Barbiturates Screen     (NEGATIVE)  


 


Ur Tricyclics Screen     (NEGATIVE)  


 


Ur Phencyclidine Scrn     (NEGATIVE)  


 


Ur Amphetamine Screen     (NEGATIVE)  


 


U Methamphetamines Scrn     (NEGATIVE)  


 


Urine MDMA Screen     (NEGATIVE)  


 


U Benzodiazepines Scrn     (NEGATIVE)  


 


U Cocaine Metab Screen     (NEGATIVE)  


 


U Marijuana (THC) Screen     (NEGATIVE)  


 


Ethyl Alcohol     mg/dL


 


SARS-CoV-2 RNA (SIMA)    Negative  (NEGATIVE)  


 


Blood Type     


 


Gel Antibody Screen     














  11/06/20 11/06/20 11/06/20 Range/Units





  09:03 09:05 09:05 


 


WBC     (4.5-11.0)  K/uL


 


RBC     (4.30-5.90)  M/uL


 


Hgb     (12.0-15.0)  g/dL


 


Hct     (40.0-54.0)  %


 


MCV     (80-98)  fL


 


MCH     (27-31)  pg


 


MCHC     (32-36)  %


 


Plt Count     (150-400)  K/uL


 


Neut % (Auto)     (36-66)  %


 


Lymph % (Auto)     (24-44)  %


 


Mono % (Auto)     (2-6)  %


 


Eos % (Auto)     (2-4)  %


 


Baso % (Auto)     (0-1)  %


 


PT     (9.5-12.0)  sec


 


INR     (0.80-1.20)  


 


D-Dimer, Quantitative   995.74 H   (0.0-500.0)  ng/mL


 


Puncture Site     


 


ABG pH     (7.350-7.450)  


 


ABG pCO2     (35.0-42.0)  mmHg


 


ABG pO2     (75.0-100.0)  mmHg


 


ABG HCO3     (22.0-26.0)  mmol/L


 


ABG Total CO2     (23.0-27.0)  mmol/L


 


ABG O2 Saturation     (95.0-98.0)  %


 


ABG O2 Content     (15.0-23.0)  %vol


 


ABG Base Excess     mm/L


 


ABG Hemoglobin     (13.5-18.0)  g/dL


 


ABG Oxyhemoglobin     %


 


ABG Carboxyhemoglobin     (0.0-1.6)  %


 


ABG Methemoglobin     %


 


Stefano Test     


 


O2 Delivery Device     


 


Sodium     (140-148)  mmol/L


 


Potassium     (3.6-5.2)  mmol/L


 


Chloride     (100-108)  mmol/L


 


Carbon Dioxide     (21-32)  mmol/L


 


Anion Gap     (5.0-14.0)  mmol/L


 


BUN     (7-18)  mg/dL


 


Creatinine     (0.8-1.3)  mg/dL


 


Est Cr Clr Drug Dosing     mL/min


 


Estimated GFR (MDRD)     (>60)  


 


Glucose     ()  mg/dL


 


Lactic Acid     (0.4-2.0)  mmol/L


 


Calcium     (8.5-10.1)  mg/dL


 


Magnesium     (1.8-2.4)  mg/dL


 


Total Bilirubin     (0.2-1.0)  mg/dL


 


AST     (15-37)  U/L


 


ALT     (12-78)  U/L


 


Alkaline Phosphatase     ()  U/L


 


Ammonia     (11-32)  mmol/L


 


Troponin I    < 0.017  (0.000-0.056)  ng/mL


 


NT-Pro-B Natriuret Pep     (5-125)  pg/mL


 


Total Protein     (6.4-8.2)  g/dL


 


Albumin     (3.4-5.0)  g/dL


 


Globulin     (2.3-3.5)  g/dL


 


Albumin/Globulin Ratio     (1.2-2.2)  


 


Urine Color     (YELLOW)  


 


Urine Appearance     (CLEAR)  


 


Urine pH     (5.0-8.0)  


 


Ur Specific Gravity     (1.008-1.030)  


 


Urine Protein     (NEGATIVE)  mg/dL


 


Urine Glucose (UA)     (NEGATIVE)  mg/dL


 


Urine Ketones     (NEGATIVE)  mg/dL


 


Urine Occult Blood     (NEGATIVE)  


 


Urine Nitrite     (NEGATIVE)  


 


Urine Bilirubin     (NEGATIVE)  


 


Urine Urobilinogen     (0.2-1.0)  EU/dL


 


Ur Leukocyte Esterase     (NEGATIVE)  


 


Urine RBC     (0-5)  


 


Urine WBC     (0-5)  


 


Ur Epithelial Cells     


 


Urine Bacteria     


 


Urine Opiates Screen  Negative    (NEGATIVE)  


 


Ur Oxycodone Screen  Negative    (NEGATIVE)  


 


Urine Methadone Screen  Negative    (NEGATIVE)  


 


Ur Propoxyphene Screen  Negative    (NEGATIVE)  


 


Ur Barbiturates Screen  Negative    (NEGATIVE)  


 


Ur Tricyclics Screen  Presumptive positive H    (NEGATIVE)  


 


Ur Phencyclidine Scrn  Negative    (NEGATIVE)  


 


Ur Amphetamine Screen  Negative    (NEGATIVE)  


 


U Methamphetamines Scrn  Negative    (NEGATIVE)  


 


Urine MDMA Screen  Negative    (NEGATIVE)  


 


U Benzodiazepines Scrn  Negative    (NEGATIVE)  


 


U Cocaine Metab Screen  Negative    (NEGATIVE)  


 


U Marijuana (THC) Screen  Negative    (NEGATIVE)  


 


Ethyl Alcohol     mg/dL


 


SARS-CoV-2 RNA (SIMA)     (NEGATIVE)  


 


Blood Type     


 


Gel Antibody Screen     











Result Diagrams: 


                                 11/06/20 07:30





                                 11/06/20 07:30





Sepsis Event Note





- Evaluation


Sepsis Screening Result: No Definite Risk





- Focused Exam


Vital Signs: 


                                   Vital Signs











  Temp Pulse Resp BP Pulse Ox


 


 11/06/20 11:59  96.6 F L  93  10 L  163/103 H  99


 


 11/06/20 11:45   97  10 L  152/104 H  98


 


 11/06/20 11:30  96.3 F L  101 H  10 L  175/112 H  98


 


 11/06/20 11:15  96.0 F L  105 H  11 L  165/105 H  99


 


 11/06/20 11:05  96.0 F L  113 H  11 L  199/129 H  99


 


 11/06/20 11:00  97.2 F  108 H  15  149/76 H  100


 


 11/06/20 10:55  97.2 F  113 H  15  137/80  100


 


 11/06/20 10:50   106 H  15  150/90 H  100


 


 11/06/20 10:45   100  15  158/94 H  100


 


 11/06/20 10:40  97.5 F  111 H  16  125/99 H  99


 


 11/06/20 08:30   137 H   172/119 H 


 


 11/06/20 07:30   63   181/107 H 


 


 11/06/20 07:21  95.4 F L  126 H  16  191/100 H  96


 


 11/06/20 07:05  95.4 F L  82  16  191/100 H  97











Problem List Initiated/Reviewed/Updated: Yes


Orders Last 24hrs: 


                               Active Orders 24 hr











 Category Date Time Status


 


 Patient Status [ADT] Routine ADT  11/06/20 13:29 Ordered


 


 Ambulate [RC] QID Care  11/06/20 13:28 Ordered


 


 Cardiac Monitoring [RC] Q12H Care  11/06/20 07:17 Active


 


 Height and Weight [RC] DAILY Care  11/06/20 13:28 Ordered


 


 Intake and Output [RC] QSHIFT Care  11/06/20 13:30 Ordered


 


 Oxygen Therapy [RC] PRN Care  11/06/20 13:29 Ordered


 


 Pulse Oximetry [RC] CONTINUOUS Care  11/06/20 13:31 Ordered


 


 Up to Chair [RC] QID Care  11/06/20 13:28 Ordered


 


 VTE/DVT Education [RC] Per Unit Routine Care  11/06/20 13:29 Ordered


 


 Vital Signs [RC] Q4H Care  11/06/20 13:29 Ordered


 


 Regular Diet [DIET] Diet  11/06/20 Dinner Ordered


 


 CBC WITH AUTO DIFF [HEME] DAILY Lab  11/07/20 13:30 Ordered


 


 CBC WITH AUTO DIFF [HEME] DAILY Lab  11/08/20 13:30 Ordered


 


 CBC WITH AUTO DIFF [HEME] DAILY Lab  11/09/20 13:30 Ordered


 


 CBC WITH AUTO DIFF [HEME] DAILY Lab  11/10/20 13:30 Ordered


 


 CBC WITH AUTO DIFF [HEME] DAILY Lab  11/11/20 13:30 Ordered


 


 COMPREHENSIVE METABOLIC PN,CMP [CHEM] DAILY Lab  11/07/20 13:30 Ordered


 


 COMPREHENSIVE METABOLIC PN,CMP [CHEM] DAILY Lab  11/08/20 13:30 Ordered


 


 COMPREHENSIVE METABOLIC PN,CMP [CHEM] DAILY Lab  11/09/20 13:30 Ordered


 


 COMPREHENSIVE METABOLIC PN,CMP [CHEM] DAILY Lab  11/10/20 13:30 Ordered


 


 CULTURE BLOOD [BC] Urgent Lab  11/06/20 08:30 Received


 


 CULTURE BLOOD [BC] Urgent Lab  11/06/20 08:30 Received


 


 INR,PT,PROTHROMBIN TIME [COAG] DAILY Lab  11/07/20 13:40 Ordered


 


 INR,PT,PROTHROMBIN TIME [COAG] DAILY Lab  11/08/20 13:40 Ordered


 


 INR,PT,PROTHROMBIN TIME [COAG] DAILY Lab  11/09/20 13:40 Ordered


 


 INR,PT,PROTHROMBIN TIME [COAG] DAILY Lab  11/10/20 13:40 Ordered


 


 INR,PT,PROTHROMBIN TIME [COAG] DAILY Lab  11/11/20 13:40 Ordered


 


 PATIENT RETYPE [BBK] Stat Lab  11/06/20 07:30 Results


 


 TYPE AND SCREEN [BBK] Stat Lab  11/06/20 07:30 Results


 


 Acetaminophen [TylenoL] Med  11/06/20 13:28 Ordered





 650 mg PO Q4H PRN   


 


 Amiodarone [Cordarone] Med  11/06/20 13:45 Ordered





 200 mg PO DAILY   


 


 Baclofen [Lioresal] Med  11/06/20 13:35 Ordered





 10 mg PO TID PRN   


 


 Colchicine [Colcrys] Med  11/06/20 13:35 Ordered





 0.6 mg PO TID PRN   


 


 Diltiazem [Cardizem CD] Med  11/06/20 13:45 Ordered





 180 mg PO DAILY   


 


 Docusate Sodium/Sennosides [Senna Plus] Med  11/06/20 13:28 Ordered





 1 tab PO BID PRN   


 


 Enoxaparin [Lovenox] Med  11/06/20 13:45 Ordered





 40 mg SUBCUT DAILY   


 


 Fluticasone/Vilanterol Med  11/06/20 13:45 Ordered





 2 inhalation INH DAILY   


 


 Folic Acid Med  11/06/20 13:45 Ordered





 1 mg PO DAILY   


 


 Furosemide [Lasix] Med  11/06/20 13:45 Ordered





 40 mg PO DAILY   


 


 Gabapentin [Neurontin] Med  11/06/20 13:45 Ordered





 300 mg PO BID   


 


 LORazepam [Ativan] Med  11/06/20 13:28 Ordered





 1 mg IV Q6H PRN   


 


 Lactated Ringers [Ringers, Lactated] 1,000 ml Med  11/06/20 13:30 Ordered





 IV ASDIRECTED   


 


 Magnesium Oxide Med  11/06/20 21:00 Ordered





 800 mg PO BID   


 


 Metoprolol Succinate [Metoprolol Succinate] Med  11/06/20 13:45 Ordered





 75 mg PO DAILY   


 


 Morphine Med  11/06/20 13:28 Ordered





 2 mg IVPUSH Q2H PRN   


 


 Multivitamin [Multi-Vitamin Daily] Med  11/07/20 09:00 Ordered





 1 each PO DAILY   


 


 Nitroglycerin [Nitrostat] Med  11/06/20 13:35 Ordered





 0.4 mg SL Q5M PRN   


 


 Ondansetron [Zofran ODT] Med  11/06/20 13:28 Ordered





 4 mg PO Q6H PRN   


 


 Ondansetron [Zofran] Med  11/06/20 13:28 Ordered





 4 mg IV Q6H PRN   


 


 Sertraline [Zoloft] Med  11/06/20 14:00 Ordered





 50 mg PO DAILY   


 


 Sodium Chloride 0.9% [Normal Saline] 1,000 ml Med  11/06/20 07:45 Active





 IV ASDIRECTED   


 


 Warfarin Sliding Scale [Coumadin Sliding Scale] Med  11/06/20 13:45 Ordered





 5 mg PO ASDIRECTED   


 


 Warfarin [Coumadin] Med  11/06/20 13:45 Ordered





 7.5 mg PO ASDIRECTED   


 


 allopurinoL [Zyloprim] Med  11/06/20 13:45 Ordered





 300 mg PO DAILY   


 


 atorvaSTATin [Lipitor] Med  11/06/20 21:00 Ordered





 10 mg PO BEDTIME   


 


 hydrALAZINE [Apresoline] Med  11/06/20 13:45 Ordered





 25 mg PO Q8H   


 


 metFORMIN HCl [Metformin HCl] Med  11/06/20 21:00 Ordered





 1,000 mg PO BID   


 


 ondansetron HCL [Zofran] Med  11/06/20 13:35 Ordered





 4 mg PO Q8H PRN   


 


 oxyCODONE Med  11/06/20 13:28 Ordered





 5 mg PO Q4H PRN   


 


 traZODone [traZODone] Med  11/06/20 21:00 Ordered





 100 mg PO BEDTIME   


 


 Blood Culture x2 Reflex Set [OM.PC] Urgent Oth  11/06/20 08:07 Ordered


 


 Resuscitation Status Routine Resus Stat  11/06/20 13:28 Ordered


 


 EKG 12 Lead [EK] Routine Ther  11/06/20 07:17 Ordered








                                Medication Orders





Acetaminophen (Tylenol)  650 mg PO Q4H PRN


   PRN Reason: Pain (Mild 1-3)/fever


Allopurinol (Zyloprim)  300 mg PO DAILY [facility]


Amiodarone HCl (Cordarone)  200 mg PO DAILY [facility]


Atorvastatin Calcium (Lipitor)  10 mg PO BEDTIME [facility]


Baclofen (Lioresal)  10 mg PO TID PRN


   PRN Reason: Pain


Colchicine (Colcrys)  0.6 mg PO TID PRN


   PRN Reason: Other


Diltiazem HCl (Cardizem Cd)  180 mg PO DAILY [facility]


Enoxaparin Sodium (Lovenox)  40 mg SUBCUT DAILY [facility]


Folic Acid (Folic Acid)  1 mg PO DAILY [facility]


Furosemide (Lasix)  40 mg PO DAILY [facility]


Gabapentin (Neurontin)  300 mg PO BID [facility]


Hydralazine HCl (Apresoline)  25 mg PO Q8H [facility]


Sodium Chloride (Normal Saline)  1,000 mls @ 250 mls/hr IV ASDIRECTED [facility]


   Last Admin: 11/06/20 08:58  Dose: 250 mls/hr


   Documented by: LENNY


Lactated Ringer's (Ringers, Lactated)  1,000 mls @ 100 mls/hr IV ASDIRECTED [facility]


Lorazepam (Ativan)  1 mg IV Q6H PRN


   PRN Reason: Nausea/Vomiting


Magnesium Oxide (Magnesium Oxide)  800 mg PO BID [facility]


Morphine Sulfate (Morphine)  2 mg IVPUSH Q2H PRN


   PRN Reason: Pain (severe 7-10)


Nitroglycerin (Nitrostat)  0.4 mg SL Q5M PRN


   PRN Reason: Chest Pain


Non-Formulary Medication (Fluticasone/Vilanterol)  2 inhalation INH DAILY [facility]


Non-Formulary Medication (Metformin Hcl [Metformin Hcl])  1,000 mg PO BID [facility]


Non-Formulary Medication (Metoprolol Succinate [Metoprolol Succinate])  75 mg PO

DAILY [facility]


Non-Formulary Medication (Multivitamin [Multi-Vitamin Daily])  1 each PO DAILY 

[facility]


Non-Formulary Medication (Ondansetron Hcl [Zofran])  4 mg PO Q8H PRN


   PRN Reason: Nausea


Non-Formulary Medication (Trazodone [Trazodone])  100 mg PO BEDTIME [facility]


Ondansetron HCl (Zofran Odt)  4 mg PO Q6H PRN


   PRN Reason: Nausea able to take PO


Ondansetron HCl (Zofran)  4 mg IV Q6H PRN


   PRN Reason: Nausea/Vomiting


Oxycodone HCl (Oxycodone)  5 mg PO Q4H PRN


   PRN Reason: Pain (moderate 4-6)


Senna/Docusate Sodium (Senna Plus)  1 tab PO BID PRN


   PRN Reason: Constipation


Sertraline HCl (Zoloft)  50 mg PO DAILY [facility]


Warfarin Sodium (Coumadin Sliding Scale)   each PO ASDIRECTED [facility]


Warfarin Sodium (Coumadin)  7.5 mg PO ASDIRECTED [facility]








Assessment/Plan Comment:: 





Assessment and Plan:


1. HEENT


No active issues at this time.


Unable to completely assess





2. Cardiac


Patient is in active atrial fibrillation with rvr.  I suspect that this is 

because the patient has not taken his home medications which include diltiazem, 

amiodarone, and metoprolol.  He has a subtherapeutic INR at this time and I 

suspect that this too is because the patient has not been compliant in some form

 or another with medications


- Resume metoprolol, diltiazem, amiodarone, coumadin per home doses


- Continuous telemetry and oximetry


- Troponin at time of admission negative





3. Respiratory


Stable, COVID 19 negative


- Supplemental oxygen to keep SpO2 between 92-96%





4. F/E/N


May be dry given incontinence of stool/urine, reduced po intake


- IV fluids per orders


- Monitor for overload





5. Renal


Stable





6. Infectious Disease


No active signs of infection at this time





7. Gastrointestinal


Obesity noted


No active issues and no signs of diarrheal illness.  Had been wretching a bit


- Zofran 4 mg IV/SL as tolerated


- May use phenergan prn if needed and zofran fails





8. Neuromusculoskeletal


Patient is on baclofen (unable to divine why)


Unable to fully assess neurological status due to encephalopathy


As to the encephalopathy could be d/t EtOH that is not being honestly reported 

by family, could be due to medication effect (likely too much of some of his 

psychotropics and/or baclofen given his presentation)


- Resume home meds


- Caution with opiates/sedative (though opiates are ordered for acute pain if 

needed)


- Will observe x 1-2 days and if there is no improvement would recommend 

transfer to facility with Neurology capability


- Could also be Wernicke's?  Will write for thiamine





Disposition: Pending





Hospitalist: Ten Chavira M.D. 11/6/2020





- Mortality Measure


Prognosis:: Good

## 2020-11-07 RX ADMIN — HYDRALAZINE HYDROCHLORIDE SCH MG: 20 INJECTION INTRAMUSCULAR; INTRAVENOUS at 10:35

## 2020-11-07 RX ADMIN — DILTIAZEM HYDROCHLORIDE SCH MG: 180 CAPSULE, COATED, EXTENDED RELEASE ORAL at 12:44

## 2020-11-07 RX ADMIN — MULTIPLE VITAMINS W/ MINERALS TAB SCH: TAB at 12:46

## 2020-11-07 RX ADMIN — METOROPROLOL TARTRATE SCH MG: 5 INJECTION, SOLUTION INTRAVENOUS at 02:29

## 2020-11-07 RX ADMIN — HYDRALAZINE HYDROCHLORIDE SCH MG: 20 INJECTION INTRAMUSCULAR; INTRAVENOUS at 03:39

## 2020-11-07 RX ADMIN — METOROPROLOL TARTRATE SCH MG: 5 INJECTION, SOLUTION INTRAVENOUS at 09:45

## 2020-11-07 RX ADMIN — MOMETASONE FUROATE AND FORMOTEROL FUMARATE DIHYDRATE SCH PUFF: 200; 5 AEROSOL RESPIRATORY (INHALATION) at 09:16

## 2020-11-07 RX ADMIN — MOMETASONE FUROATE AND FORMOTEROL FUMARATE DIHYDRATE SCH PUFF: 200; 5 AEROSOL RESPIRATORY (INHALATION) at 21:02

## 2020-11-07 RX ADMIN — DILTIAZEM HYDROCHLORIDE SCH MLS/HR: 100 INJECTION, POWDER, LYOPHILIZED, FOR SOLUTION INTRAVENOUS at 05:00

## 2020-11-07 NOTE — PCM.PN
- General Info


Date of Service: 11/07/20


Admission Dx/Problem (Free Text): 





1. Encephalopathy


Subjective Update: 





Patient is awake, alert and answering questions this AM.  He is still a little 

groggy and foggy about what has happened, but on the whole is doing much better.

 He is still unclear as to what happened but "thinks" that it is possible he may

have tried to "catch up" on missed meds.  This would raise the spectre of an 

unintentional med overdose.


Functional Status: Reports: Pain Controlled, Tolerating Diet





- Review of Systems


General: Reports: No Symptoms


HEENT: Reports: No Symptoms


Pulmonary: Reports: No Symptoms


Cardiovascular: Reports: No Symptoms


Gastrointestinal: Reports: No Symptoms


Neurological: Reports: Confusion


Psychiatric: Reports: Confusion





- Patient Data


Vitals - Most Recent: 


                                Last Vital Signs











Temp  99.4 F   11/07/20 05:00


 


Pulse  86   11/07/20 09:45


 


Resp  14   11/07/20 06:00


 


BP  154/84 H  11/07/20 09:45


 


Pulse Ox  96   11/07/20 06:00











Weight - Most Recent: 432 lb 1.696 oz


Lab Results Last 24 Hours: 


                         Laboratory Results - last 24 hr











  11/07/20 11/07/20 11/07/20 Range/Units





  04:30 04:30 04:30 


 


WBC  10.2    (4.5-11.0)  K/uL


 


RBC  4.96    (4.30-5.90)  M/uL


 


Hgb  13.8    (12.0-15.0)  g/dL


 


Hct  44.4    (40.0-54.0)  %


 


MCV  90    (80-98)  fL


 


MCH  28    (27-31)  pg


 


MCHC  31 L    (32-36)  %


 


Plt Count  188    (150-400)  K/uL


 


Neut % (Auto)  86 H    (36-66)  %


 


Lymph % (Auto)  6 L    (24-44)  %


 


Mono % (Auto)  9 H    (2-6)  %


 


Eos % (Auto)  0 L    (2-4)  %


 


Baso % (Auto)  0    (0-1)  %


 


PT   18.7 H   (9.5-12.0)  sec


 


INR   1.73 H   (0.80-1.20)  


 


Sodium    140  (140-148)  mmol/L


 


Potassium    3.7  (3.6-5.2)  mmol/L


 


Chloride    103  (100-108)  mmol/L


 


Carbon Dioxide    28  (21-32)  mmol/L


 


Anion Gap    8.6  (5.0-14.0)  mmol/L


 


BUN    16  (7-18)  mg/dL


 


Creatinine    1.2  (0.8-1.3)  mg/dL


 


Est Cr Clr Drug Dosing    59.38  mL/min


 


Estimated GFR (MDRD)    > 60  (>60)  


 


Glucose    196 H  ()  mg/dL


 


Calcium    8.3 L  (8.5-10.1)  mg/dL


 


Total Bilirubin    0.8  (0.2-1.0)  mg/dL


 


AST    37  (15-37)  U/L


 


ALT    25  (12-78)  U/L


 


Alkaline Phosphatase    126 H  ()  U/L


 


Total Protein    7.4  (6.4-8.2)  g/dL


 


Albumin    2.6 L  (3.4-5.0)  g/dL


 


Globulin    4.8 H  (2.3-3.5)  g/dL


 


Albumin/Globulin Ratio    0.5 L  (1.2-2.2)  











Parker Results Last 24 Hours: 


                                  Microbiology











 11/06/20 08:30 Aerobic Blood Culture - Preliminary





 Blood - Arm, Right    NO GROWTH AFTER 1 DAY





 Anaerobic Blood Culture - Preliminary





    NO GROWTH AFTER 1 DAY


 


 11/06/20 08:30 Aerobic Blood Culture - Preliminary





 Blood - Venous - Lab Draw    NO GROWTH AFTER 1 DAY





 Anaerobic Blood Culture - Preliminary





    NO GROWTH AFTER 1 DAY











Med Orders - Current: 


                               Current Medications





Acetaminophen (Tylenol)  650 mg PO Q4H PRN


   PRN Reason: Pain (Mild 1-3)/fever


   Last Admin: 11/07/20 11:20 Dose:  650 mg


   Documented by: 


Allopurinol (Zyloprim)  300 mg PO DAILY [facility]


Amiodarone HCl (Cordarone)  200 mg PO DAILY [facility]


   Last Admin: 11/06/20 17:31 Dose:  Not Given


   Documented by: 


Atorvastatin Calcium (Lipitor)  10 mg PO BEDTIME [facility]


   Last Admin: 11/06/20 22:02 Dose:  Not Given


   Documented by: 


Baclofen (Lioresal)  10 mg PO TID PRN


   PRN Reason: Pain


   Last Admin: 11/07/20 11:19 Dose:  10 mg


   Documented by: 


Colchicine (Colcrys)  0.6 mg PO TID PRN


   PRN Reason: Other


Diltiazem HCl (Cardizem Cd)  180 mg PO DAILY [facility]


Enoxaparin Sodium (Lovenox)  40 mg SUBCUT Q24H [facility]


   Last Admin: 11/06/20 17:49 Dose:  40 mg


   Documented by: 


Folic Acid (Folic Acid)  1 mg PO DAILY [facility]


   Last Admin: 11/06/20 17:31 Dose:  Not Given


   Documented by: 


Furosemide (Lasix)  40 mg PO DAILY [facility]


Gabapentin (Neurontin)  300 mg PO BID [facility]


   Last Admin: 11/06/20 22:02 Dose:  Not Given


   Documented by: 


Hydralazine HCl (Apresoline)  25 mg PO Q8H [facility]


Lactated Ringer's (Ringers, Lactated)  1,000 mls @ 100 mls/hr IV ASDIRECTED [facility]


   Last Admin: 11/07/20 10:10 Dose:  100 mls/hr


   Documented by: 


Thiamine HCl 100 mg/ Sodium (Chloride)  101 mls @ 202 mls/hr IV Q24H [facility]


   Last Admin: 11/06/20 16:07 Dose:  202 mls/hr


   Documented by: 


Lorazepam (Ativan)  1 mg IV Q6H PRN


   PRN Reason: Nausea/Vomiting


Magnesium Oxide (Magnesium Oxide)  800 mg PO BID [facility]


   Last Admin: 11/06/20 22:02 Dose:  Not Given


   Documented by: 


Metformin HCl (Glucophage)  1,000 mg PO BID [facility]


   Last Admin: 11/06/20 22:02 Dose:  Not Given


   Documented by: 


Metoprolol Succinate (Toprol Xl)  75 mg PO DAILY [facility]


Mometasone Furoate/Formoterol Fumar (Dulera 200-5 Mcg)  2 puff IH BIDRT [facility]


   Last Admin: 11/07/20 09:16 Dose:  2 puff


   Documented by: 


Morphine Sulfate (Morphine)  2 mg IVPUSH Q2H PRN


   PRN Reason: Pain (severe 7-10)


   Last Admin: 11/07/20 03:40 Dose:  2 mg


   Documented by: 


Multivitamins/Minerals (Thera M Plus)  1 tab PO DAILY [facility]


Nitroglycerin (Nitrostat)  0.4 mg SL Q5M PRN


   PRN Reason: Chest Pain


Ondansetron HCl (Zofran)  4 mg IV Q6H PRN


   PRN Reason: Nausea/Vomiting


Oxycodone HCl (Oxycodone)  5 mg PO Q4H PRN


   PRN Reason: Pain (moderate 4-6)


Senna/Docusate Sodium (Senna Plus)  1 tab PO BID PRN


   PRN Reason: Constipation


Sertraline HCl (Zoloft)  50 mg PO DAILY [facility]


Trazodone HCl (Trazodone)  100 mg PO BEDTIME [facility]


   Last Admin: 11/06/20 22:02 Dose:  Not Given


   Documented by: 


Warfarin Sodium (Coumadin)  5 mg PO SuTuThSa@1300 [facility]


Warfarin Sodium 2.5 mg/ (Warfarin Sodium 5 mg)  7.5 mg PO MoWeFr@1300 [facility]


   Last Admin: 11/06/20 17:32 Dose:  Not Given


   Documented by: 





Discontinued Medications





Epinephrine HCl (Adrenalin) Confirm Administered Dose 1 mg .ROUTE .STK-MED ONE


   Stop: 11/06/20 10:13


Furosemide (Lasix)  40 mg PO DAILY [facility]


   Last Admin: 11/06/20 17:31 Dose:  Not Given


   Documented by: 


Furosemide (Lasix)  20 mg IV ONETIME ONE


   Stop: 11/06/20 18:16


   Last Admin: 11/06/20 17:49 Dose:  20 mg


   Documented by: 


Furosemide (Lasix)  20 mg IV Q24H [facility]


Hydralazine HCl (Apresoline)  10 mg IV Q6H [facility]


   Last Admin: 11/07/20 10:35 Dose:  10 mg


   Documented by: 


Sodium Chloride (Normal Saline)  1,000 mls @ 250 mls/hr IV ASDIRECTED [facility]


   Last Admin: 11/06/20 08:58 Dose:  250 mls/hr


   Documented by: 


Diltiazem HCl 100 mg/ Sodium (Chloride)  100 mls @ 5 mls/hr IV TITRATE [facility]; 

Protocol


   Last Admin: 11/07/20 05:00 Dose:  15 mg/hr, 15 mls/hr


   Documented by: 


Metoprolol Succinate (Toprol Xl)  75 mg PO DAILY [facility]


Metoprolol Tartrate (Lopressor)  5 mg IV Q6H [facility]


   Last Admin: 11/07/20 09:45 Dose:  5 mg


   Documented by: 


Ondansetron HCl (Zofran)  4 mg IVPUSH ONETIME ONE


   Stop: 11/06/20 08:41


   Last Admin: 11/06/20 08:59 Dose:  4 mg


   Documented by: 


Ondansetron HCl (Zofran)  4 mg IVPUSH ONETIME STA


   Stop: 11/06/20 12:54


   Last Admin: 11/06/20 13:00 Dose:  4 mg


   Documented by: 


Pantoprazole Sodium (Protonix Iv***)  40 mg IVPUSH ONETIME ONE


   Stop: 11/06/20 07:48


   Last Admin: 11/06/20 08:52 Dose:  40 mg


   Documented by: 


Propofol (Diprivan  20 Ml) Confirm Administered Dose 200 mg .ROUTE .STK-MED ONE


   Stop: 11/06/20 10:20











- Exam


Quality Assessment: Supplemental Oxygen, DVT Prophylaxis


General: Alert, Cooperative, No Acute Distress


Lungs: Clear to Auscultation, Normal Respiratory Effort


Cardiovascular: Regular Rate, Regular Rhythm


GI/Abdominal Exam: Normal Bowel Sounds


Neurological: No New Focal Deficit


Psy/Mental Status: Alert, Normal Affect, Normal Mood





Sepsis Event Note





- Evaluation


Sepsis Screening Result: No Definite Risk





- Focused Exam


Vital Signs: 


                                   Vital Signs











  Temp Pulse Pulse Resp BP BP Pulse Ox


 


 11/07/20 09:45   86    154/84 H  


 


 11/07/20 06:00    90  14   152/90 H  96


 


 11/07/20 05:00  99.4 F   101 H  13   144/72 H  95


 


 11/07/20 04:00  99.2 F   91  18   144/72 H  95


 


 11/07/20 03:00    82  12   140/78  97


 


 11/07/20 02:29   97    164/86 H  


 


 11/07/20 02:00    104 H  19   164/86 H  93 L


 


 11/07/20 01:00    103 H  17   177/87 H  93 L














- Problem List Review


Problem List Initiated/Reviewed/Updated: Yes





- My Orders


Last 24 Hours: 


My Active Orders





11/06/20 13:28


Ambulate [RC] QID 


Height and Weight [RC] DAILY 


Up to Chair [RC] QID 


Acetaminophen [TylenoL]   650 mg PO Q4H PRN 


Docusate Sodium/Sennosides [Senna Plus]   1 tab PO BID PRN 


LORazepam [Ativan]   1 mg IV Q6H PRN 


Morphine   2 mg IVPUSH Q2H PRN 


Ondansetron [Zofran]   4 mg IV Q6H PRN 


oxyCODONE   5 mg PO Q4H PRN 


Resuscitation Status Routine 





11/06/20 13:29


Patient Status [ADT] Routine 


Oxygen Therapy [RC] PRN 


VTE/DVT Education [RC] Per Unit Routine 


Vital Signs [RC] Q1H 





11/06/20 13:30


Intake and Output [RC] QSHIFT 


Lactated Ringers [Ringers, Lactated] 1,000 ml IV ASDIRECTED 





11/06/20 13:31


Pulse Oximetry [RC] CONTINUOUS 





11/06/20 13:35


Baclofen [Lioresal]   10 mg PO TID PRN 


Colchicine [Colcrys]   0.6 mg PO TID PRN 


Nitroglycerin [Nitrostat]   0.4 mg SL Q5M PRN 





11/06/20 14:00


Amiodarone [Cordarone]   200 mg PO DAILY 


Folic Acid   1 mg PO DAILY 





11/06/20 15:00


Enoxaparin [Lovenox]   40 mg SUBCUT Q24H 


Thiamine [Vitamin B-1] 100 mg   Sodium Chloride 0.9% [Normal Saline] 100 ml IV 

Q24H 





11/06/20 Dinner


Regular Diet [DIET] 





11/06/20 21:00


Gabapentin [Neurontin]   300 mg PO BID 


Magnesium Oxide   800 mg PO BID 


Mometasone/Formoterol [Dulera 200-5 MCG]   2 puff IH BIDRT 


atorvaSTATin [Lipitor]   10 mg PO BEDTIME 


metFORMIN [Glucophage]   1,000 mg PO BID 


traZODone   100 mg PO BEDTIME 





11/07/20 09:00


Multivitamins w-Iron/Ca/FA/Min [Thera M Plus]   1 tab PO DAILY 


Sertraline [Zoloft]   50 mg PO DAILY 


allopurinoL [Zyloprim]   300 mg PO DAILY 





11/07/20 12:30


Diltiazem [Cardizem CD]   180 mg PO DAILY 


Furosemide [Lasix]   40 mg PO DAILY 


Metoprolol Succinate [Toprol XL]   75 mg PO DAILY 





11/07/20 13:00


Warfarin [Coumadin]   5 mg PO SuTuThSa@1300 





11/07/20 14:00


hydrALAZINE [Apresoline]   25 mg PO Q8H 





11/08/20 05:00


CBC WITH AUTO DIFF [HEME] DAILY 


COMPREHENSIVE METABOLIC PN,CMP [CHEM] DAILY 


INR,PT,PROTHROMBIN TIME [COAG] DAILY 





11/09/20 05:00


CBC WITH AUTO DIFF [HEME] DAILY 


COMPREHENSIVE METABOLIC PN,CMP [CHEM] DAILY 


INR,PT,PROTHROMBIN TIME [COAG] DAILY 





11/10/20 05:00


CBC WITH AUTO DIFF [HEME] DAILY 


COMPREHENSIVE METABOLIC PN,CMP [CHEM] DAILY 


INR,PT,PROTHROMBIN TIME [COAG] DAILY 





11/11/20 05:00


CBC WITH AUTO DIFF [HEME] DAILY 


COMPREHENSIVE METABOLIC PN,CMP [CHEM] DAILY 


INR,PT,PROTHROMBIN TIME [COAG] DAILY 














- Plan


Plan:: 





Assessment and Plan:


1. HEENT


No active issues at this time.


Unable to completely assess





2. Cardiac


Patient is in active atrial fibrillation with rvr.  I suspect that this is 

because the patient has not taken his home medications which include diltiazem, 

amiodarone, and metoprolol.  He has a subtherapeutic INR at this time and I 

suspect that this too is because the patient has not been compliant in some form

 or another with medications


- Resume metoprolol, diltiazem, amiodarone, coumadin per home doses


- Continuous telemetry and oximetry


- Troponin at time of admission negative





3. Respiratory


Stable, COVID 19 negative


- Supplemental oxygen to keep SpO2 between 92-96%





4. F/E/N


May be dry given incontinence of stool/urine, reduced po intake


- IV fluids per orders


- Monitor for overload





5. Renal


Stable





6. Infectious Disease


No active signs of infection at this time





7. Gastrointestinal


Obesity noted


No active issues and no signs of diarrheal illness.  Had been wretching a bit


- Zofran 4 mg IV/SL as tolerated


- May use phenergan prn if needed and zofran fails





8. Neuromusculoskeletal


Patient is on baclofen (unable to divine why)


Unable to fully assess neurological status due to encephalopathy


As to the encephalopathy could be d/t EtOH that is not being honestly reported 

by family, could be due to medication effect (likely too much of some of his 

psychotropics and/or baclofen given his presentation)


UPDATE HD #2


Patient seems to be waking up and noted that he may have played "catchup" with 

some of his medications.  It appears that this is likely the encephalopathy of 

an unintentional overdose.  His toxicology was positive for TCA.  


- Resume home meds


- Caution with opiates/sedative (though opiates are ordered for acute pain if 

needed)


- Will observe x 1-2 days and if there is no improvement would recommend 

transfer to facility with Neurology capability


- Could also be Wernicke's?  Will write for thiamine





Disposition: Pending





Hospitalist: Ten Chavira M.D. 11/6/2020

## 2020-11-08 RX ADMIN — DILTIAZEM HYDROCHLORIDE SCH MG: 180 CAPSULE, COATED, EXTENDED RELEASE ORAL at 09:31

## 2020-11-08 RX ADMIN — MOMETASONE FUROATE AND FORMOTEROL FUMARATE DIHYDRATE SCH PUFF: 200; 5 AEROSOL RESPIRATORY (INHALATION) at 07:22

## 2020-11-08 RX ADMIN — MULTIPLE VITAMINS W/ MINERALS TAB SCH TAB: TAB at 10:44

## 2020-11-08 NOTE — [id number].[id number]
**Discharge Summary





- Hospital Course


Free Text/Narrative:: 





1. Encephalopathy, due to unintentional medication overdose


HPI Initial Comments: 





Jose Levine is a 66 yo male admitted to [facility] from the ED on 6 November 2020 for 2 day history of encephalopathy.  The paitent, per the report from the 

wife to the ER, has been incontinent of stool, urine and has been minimally 

responsive for that time.  Per history the patient has myriad medical problems 

including prior alcoholism from which is is reportedly abstinent x 3 mos.  The 

patient's BAL at < 0.01 at time of admission confirms this, at least for the 

past 48 hours or so.  The patient has a PCA, his brother, who is supposedly jaden

ging meds and routine cares.  However it is unclear whether the patient has been

taking the meds as prescribed.  His wife notes that he has, over the past few 

days, been questionably compliant with medication.





The patient for his part is arousable but otherwise unable to offer ANY 

meaningful history on interview.  Family was not available at that time either. 







RN notes that the patient is in atrial fibrillation with RVR.  He is also 

nauseated.





ED suspects that the patient has been goofing up a number of his medications 

which may have triggered the episode of encephalopathy.  Admission to ICU is 

requested for evaluation and management of the encephalopathy





The patient was restarted on his home regimen of medication and has done well 

with near total return to normal mentation.  He has been quite stable and is 

felt to be stable to discharge.


Diagnosis: Stroke: No





- Discharge Data


Discharge Date: 11/08/20


Discharge Disposition: Home, Self-Care 01


Condition: Stable





- Referral to Home Health


Primary Care Physician: 


PCP None








- Discharge Plan


*PRESCRIPTION DRUG MONITORING PROGRAM REVIEWED*: No


*COPY OF PRESCRIPTION DRUG MONITORING REPORT IN PATIENT NICA: No


Home Medications: 


                                    Home Meds





Allopurinol [Zyloprim] 300 mg PO DAILY 07/16/20 [History]


Colchicine [Colcrys] 0.6 mg PO TID PRN 07/16/20 [History]


Diltiazem HCl [Diltiazem 24Hr ER] 180 mg PO DAILY 07/16/20 [History]


Furosemide 40 mg PO DAILY 07/16/20 [History]


Magnesium Oxide 800 mg PO BID 07/16/20 [History]


Metoprolol Succinate 75 mg PO DAILY 07/16/20 [History]


Sertraline [Zoloft] 50 mg PO DAILY 07/16/20 [History]


hydrALAZINE [Apresoline] 25 mg PO Q8H 07/16/20 [History]


traZODone 100 mg PO BEDTIME 07/16/20 [History]


Amiodarone [Cordarone] 200 mg PO DAILY 07/19/20 [History]


Baclofen 10 mg PO TID PRN 07/19/20 [History]


Folic Acid 1 mg PO DAILY 07/19/20 [History]


Multivitamin [Multi-Vitamin Daily] 1 each PO DAILY 07/19/20 [History]


Nitroglycerin [Nitrostat] 0.4 mg PO Q5M PRN 07/19/20 [History]


Warfarin Sliding Scale [Coumadin Sliding Scale] 5 mg PO ASDIRECTED 07/19/20 

[History]


Warfarin Sodium [Jantoven] 7.5 mg PO ASDIRECTED 07/19/20 [History]


metFORMIN HCl [Metformin HCl] 1,000 mg PO BID 07/19/20 [History]


ondansetron HCL [Zofran] 4 mg PO Q8H PRN 07/19/20 [History]


Fluticasone/Vilanterol [Breo Ellipta 100-25 MCG Inhalation Kit] 2 inhalation INH

DAILY 07/21/20 [History]


Acetaminophen [Tylenol] 1 - 2 tab PO Q4H PRN 11/06/20 [History]


Gabapentin [Neurontin] 300 mg PO BID 11/06/20 [History]


atorvaSTATin [Lipitor] 10 mg PO BEDTIME 11/06/20 [History]


Acetaminophen [Tylenol] 650 mg PO Q4H PRN  tablet 11/08/20 [Rx]


Docusate Sodium/Sennosides [Senna Plus] 1 tab PO BID PRN  tablet 11/08/20 [Rx]








Forms:  ED Department Discharge


Referrals: 


PCP,None [Primary Care Provider] - 





- Discharge Summary/Plan Comment


DC Time >30 min.: Yes





- Patient Data


Vitals - Most Recent: 


                                Last Vital Signs











Temp  98.2 F   11/08/20 08:00


 


Pulse  78   11/08/20 11:46


 


Resp  20   11/08/20 11:46


 


BP  150/82 H  11/08/20 09:32


 


Pulse Ox  93 L  11/08/20 11:46











Weight - Most Recent: 432 lb 1.696 oz


I&O - Last 24 hours: 


                                 Intake & Output











 11/07/20 11/08/20 11/08/20





 22:59 06:59 14:59


 


Intake Total  3939 


 


Balance  3939 











Lab Results - Last 24 hrs: 


                         Laboratory Results - last 24 hr











  11/08/20 11/08/20 11/08/20 Range/Units





  04:45 04:45 04:45 


 


WBC  8.4    (4.5-11.0)  K/uL


 


RBC  4.49    (4.30-5.90)  M/uL


 


Hgb  12.7    (12.0-15.0)  g/dL


 


Hct  40.7    (40.0-54.0)  %


 


MCV  91    (80-98)  fL


 


MCH  28    (27-31)  pg


 


MCHC  31 L    (32-36)  %


 


Plt Count  146 L    (150-400)  K/uL


 


Neut % (Auto)  69 H    (36-66)  %


 


Lymph % (Auto)  15 L    (24-44)  %


 


Mono % (Auto)  15 H    (2-6)  %


 


Eos % (Auto)  1 L    (2-4)  %


 


Baso % (Auto)  1    (0-1)  %


 


PT   18.0 H   (9.5-12.0)  sec


 


INR   1.67 H   (0.80-1.20)  


 


Sodium    140  (140-148)  mmol/L


 


Potassium    3.3 L  (3.6-5.2)  mmol/L


 


Chloride    102  (100-108)  mmol/L


 


Carbon Dioxide    32  (21-32)  mmol/L


 


Anion Gap    9.3  (5.0-14.0)  mmol/L


 


BUN    21 H  (7-18)  mg/dL


 


Creatinine    1.4 H  (0.8-1.3)  mg/dL


 


Est Cr Clr Drug Dosing    50.89  mL/min


 


Estimated GFR (MDRD)    51 L  (>60)  


 


Glucose    117 H  ()  mg/dL


 


Calcium    7.9 L  (8.5-10.1)  mg/dL


 


Total Bilirubin    0.6  (0.2-1.0)  mg/dL


 


AST    32  (15-37)  U/L


 


ALT    20  (12-78)  U/L


 


Alkaline Phosphatase    103  ()  U/L


 


Total Protein    6.6  (6.4-8.2)  g/dL


 


Albumin    2.4 L  (3.4-5.0)  g/dL


 


Globulin    4.2 H  (2.3-3.5)  g/dL


 


Albumin/Globulin Ratio    0.6 L  (1.2-2.2)  











RASHARD Results - Last 24 hrs: 


                                  Microbiology











 11/06/20 08:30 Aerobic Blood Culture - Preliminary





 Blood - Venous - Lab Draw    NO GROWTH AFTER 2 DAYS





 Anaerobic Blood Culture - Preliminary





    NO GROWTH AFTER 2 DAYS


 


 11/06/20 08:30 Aerobic Blood Culture - Preliminary





 Blood - Arm, Right    NO GROWTH AFTER 2 DAYS





 Anaerobic Blood Culture - Preliminary





    NO GROWTH AFTER 2 DAYS











Med Orders - Current: 


                               Current Medications





Acetaminophen (Tylenol)  650 mg PO Q4H PRN


   PRN Reason: Pain (Mild 1-3)/fever


   Last Admin: 11/07/20 11:20 Dose:  650 mg


   Documented by: 


Allopurinol (Zyloprim)  300 mg PO DAILY [facility]


   Last Admin: 11/08/20 10:45 Dose:  300 mg


   Documented by: 


Amiodarone HCl (Cordarone)  200 mg PO DAILY [facility]


   Last Admin: 11/08/20 09:32 Dose:  200 mg


   Documented by: 


Atorvastatin Calcium (Lipitor)  10 mg PO BEDTIME [facility]


   Last Admin: 11/07/20 21:03 Dose:  10 mg


   Documented by: 


Baclofen (Lioresal)  10 mg PO TID PRN


   PRN Reason: Pain


   Last Admin: 11/07/20 11:19 Dose:  10 mg


   Documented by: 


Colchicine (Colcrys)  0.6 mg PO TID PRN


   PRN Reason: Other


Diltiazem HCl (Cardizem Cd)  180 mg PO DAILY [facility]


   Last Admin: 11/08/20 09:31 Dose:  180 mg


   Documented by: 


Enoxaparin Sodium (Lovenox)  40 mg SUBCUT Q24H [facility]


   Last Admin: 11/07/20 16:19 Dose:  40 mg


   Documented by: 


Folic Acid (Folic Acid)  1 mg PO DAILY [facility]


   Last Admin: 11/08/20 10:44 Dose:  1 mg


   Documented by: 


Furosemide (Lasix)  40 mg PO DAILY [facility]


   Last Admin: 11/08/20 09:32 Dose:  40 mg


   Documented by: 


Gabapentin (Neurontin)  300 mg PO BID [facility]


   Last Admin: 11/08/20 09:33 Dose:  300 mg


   Documented by: 


Hydralazine HCl (Apresoline)  25 mg PO Q8H [facility]


   Last Admin: 11/08/20 05:46 Dose:  25 mg


   Documented by: 


Lactated Ringer's (Ringers, Lactated)  1,000 mls @ 100 mls/hr IV ASDIRECTED [facility]


   Last Admin: 11/08/20 06:06 Dose:  100 mls/hr


   Documented by: 


Thiamine HCl 100 mg/ Sodium (Chloride)  101 mls @ 202 mls/hr IV Q24H [facility]


   Last Admin: 11/07/20 16:20 Dose:  202 mls/hr


   Documented by: 


Lorazepam (Ativan)  1 mg IV Q6H PRN


   PRN Reason: Nausea/Vomiting


Magnesium Oxide (Magnesium Oxide)  800 mg PO BID [facility]


   Last Admin: 11/08/20 10:43 Dose:  800 mg


   Documented by: 


Metformin HCl (Glucophage)  1,000 mg PO BID [facility]


   Last Admin: 11/08/20 10:46 Dose:  1,000 mg


   Documented by: 


Metoprolol Succinate (Toprol Xl)  75 mg PO DAILY [facility]


   Last Admin: 11/08/20 09:32 Dose:  75 mg


   Documented by: 


Mometasone Furoate/Formoterol Fumar (Dulera 200-5 Mcg)  2 puff IH BIDRT [facility]


   Last Admin: 11/08/20 07:22 Dose:  2 puff


   Documented by: 


Morphine Sulfate (Morphine)  2 mg IVPUSH Q2H PRN


   PRN Reason: Pain (severe 7-10)


   Last Admin: 11/08/20 00:00 Dose:  2 mg


   Documented by: 


Multivitamins/Minerals (Thera M Plus)  1 tab PO DAILY [facility]


   Last Admin: 11/08/20 10:44 Dose:  1 tab


   Documented by: 


Nitroglycerin (Nitrostat)  0.4 mg SL Q5M PRN


   PRN Reason: Chest Pain


Ondansetron HCl (Zofran)  4 mg IV Q6H PRN


   PRN Reason: Nausea/Vomiting


Oxycodone HCl (Oxycodone)  5 mg PO Q4H PRN


   PRN Reason: Pain (moderate 4-6)


Senna/Docusate Sodium (Senna Plus)  1 tab PO BID PRN


   PRN Reason: Constipation


Sertraline HCl (Zoloft)  50 mg PO DAILY [facility]


   Last Admin: 11/08/20 10:44 Dose:  50 mg


   Documented by: 


Trazodone HCl (Trazodone)  100 mg PO BEDTIME [facility]


   Last Admin: 11/07/20 21:03 Dose:  100 mg


   Documented by: 


Warfarin Sodium (Coumadin)  5 mg PO SuTuThSa@1300 [facility]


   Last Admin: 11/07/20 14:01 Dose:  5 mg


   Documented by: 


Warfarin Sodium 2.5 mg/ (Warfarin Sodium 5 mg)  7.5 mg PO MoWeFr@1300 [facility]


   Last Admin: 11/06/20 17:32 Dose:  Not Given


   Documented by: 





Discontinued Medications





Epinephrine HCl (Adrenalin) Confirm Administered Dose 1 mg .ROUTE .STK-MED ONE


   Stop: 11/06/20 10:13


Furosemide (Lasix)  40 mg PO DAILY [facility]


   Last Admin: 11/06/20 17:31 Dose:  Not Given


   Documented by: 


Furosemide (Lasix)  20 mg IV ONETIME ONE


   Stop: 11/06/20 18:16


   Last Admin: 11/06/20 17:49 Dose:  20 mg


   Documented by: 


Furosemide (Lasix)  20 mg IV Q24H [facility]


   Last Admin: 11/07/20 13:51 Dose:  Not Given


   Documented by: 


Hydralazine HCl (Apresoline)  10 mg IV Q6H [facility]


   Last Admin: 11/07/20 10:35 Dose:  10 mg


   Documented by: 


Sodium Chloride (Normal Saline)  1,000 mls @ 250 mls/hr IV ASDIRECTED [facility]


   Last Admin: 11/06/20 08:58 Dose:  250 mls/hr


   Documented by: 


Diltiazem HCl 100 mg/ Sodium (Chloride)  100 mls @ 5 mls/hr IV TITRATE [facility]; 

Protocol


   Last Admin: 11/07/20 05:00 Dose:  15 mg/hr, 15 mls/hr


   Documented by: 


Metoprolol Succinate (Toprol Xl)  75 mg PO DAILY [facility]


Metoprolol Tartrate (Lopressor)  5 mg IV Q6H [facility]


   Last Admin: 11/07/20 09:45 Dose:  5 mg


   Documented by: 


Ondansetron HCl (Zofran)  4 mg IVPUSH ONETIME ONE


   Stop: 11/06/20 08:41


   Last Admin: 11/06/20 08:59 Dose:  4 mg


   Documented by: 


Ondansetron HCl (Zofran)  4 mg IVPUSH ONETIME STA


   Stop: 11/06/20 12:54


   Last Admin: 11/06/20 13:00 Dose:  4 mg


   Documented by: 


Pantoprazole Sodium (Protonix Iv***)  40 mg IVPUSH ONETIME ONE


   Stop: 11/06/20 07:48


   Last Admin: 11/06/20 08:52 Dose:  40 mg


   Documented by: 


Propofol (Diprivan  20 Ml) Confirm Administered Dose 200 mg .ROUTE .STK-MED ONE


   Stop: 11/06/20 10:20











- Exam


Quality Assessment: Reports: DVT Prophylaxis.  Denies: Supplemental Oxygen


General: Reports: Alert, Oriented, Cooperative, No Acute Distress


Lungs: Reports: Clear to Auscultation, Normal Respiratory Effort


Cardiovascular: Reports: Regular Rate, Regular Rhythm, No Murmurs


GI/Abdominal Exam: Normal Bowel Sounds, Soft, Non-Tender, No Distention, No 

Abnormal Bruit, No Mass


Neurological: Reports: No New Focal Deficit, Normal Gait, Normal Speech, Normal 

Tone


Psy/Mental Status: Reports: Alert, Normal Affect, Normal Mood

## 2020-11-20 NOTE — OR
DATE OF PROCEDURE:  11/06/2020

 

SURGEON:  Maximino Briceno MD

 

PROCEDURE PERFORMED:  Esophagogastroduodenoscopy with esophageal variceal banding.

 

COMPLICATIONS:  None.

 

ASSISTANT:  None.

 

ANESTHETIC:  MAC.

 

PREOPERATIVE DIAGNOSIS:  Gastrointestinal bleeding.

 

POSTOPERATIVE DIAGNOSIS:  Gastrointestinal bleeding.

 

RISKS:  Risks, benefits, alternatives, and limitations including, but not limited to

infection, bleeding, chronic pain, exsanguination, and other risks not listed here were

explained to the patient.  They wished to proceed.

 

PROCEDURE IN DETAIL:  The patient was placed in left lateral decubitus position.  The EGD

scope was introduced and advanced atraumatically to the second part of the duodenum.  No

evidence of duodenitis or ulceration.  No gastritis.  No gastric ulcer.  No abnormalities on

retroflex.  Within the esophagus itself, there was an esophageal varix which appeared to be

bleeding recently.  This was banded x1.  No abnormal bleeding was noted after banding.  The

remainder of the esophagus was inspected without abnormality.  The patient tolerated the

procedure well.

 

 

 

 

Maximino Briceno MD

DD:  11/20/2020 08:36:59

DT:  11/20/2020 14:55:54

Job #:  [id number]

## 2021-03-03 ENCOUNTER — HOSPITAL ENCOUNTER (EMERGENCY)
Dept: HOSPITAL 11 - JP.ED | Age: 66
LOS: 3 days | Discharge: HOME | End: 2021-03-06
Payer: MEDICARE

## 2021-03-03 DIAGNOSIS — Z79.01: ICD-10-CM

## 2021-03-03 DIAGNOSIS — E87.6: ICD-10-CM

## 2021-03-03 DIAGNOSIS — Z79.899: ICD-10-CM

## 2021-03-03 DIAGNOSIS — Z88.8: ICD-10-CM

## 2021-03-03 DIAGNOSIS — I48.0: ICD-10-CM

## 2021-03-03 DIAGNOSIS — E66.9: ICD-10-CM

## 2021-03-03 DIAGNOSIS — Z79.84: ICD-10-CM

## 2021-03-03 DIAGNOSIS — I50.9: ICD-10-CM

## 2021-03-03 DIAGNOSIS — Z20.822: ICD-10-CM

## 2021-03-03 DIAGNOSIS — E11.65: ICD-10-CM

## 2021-03-03 DIAGNOSIS — F32.2: Primary | ICD-10-CM

## 2021-03-03 PROCEDURE — 99285 EMERGENCY DEPT VISIT HI MDM: CPT

## 2021-03-03 PROCEDURE — U0002 COVID-19 LAB TEST NON-CDC: HCPCS

## 2021-03-03 PROCEDURE — 80179 DRUG ASSAY SALICYLATE: CPT

## 2021-03-03 PROCEDURE — 80307 DRUG TEST PRSMV CHEM ANLYZR: CPT

## 2021-03-03 PROCEDURE — 85025 COMPLETE CBC W/AUTO DIFF WBC: CPT

## 2021-03-03 PROCEDURE — 84132 ASSAY OF SERUM POTASSIUM: CPT

## 2021-03-03 PROCEDURE — 36415 COLL VENOUS BLD VENIPUNCTURE: CPT

## 2021-03-03 PROCEDURE — 80143 DRUG ASSAY ACETAMINOPHEN: CPT

## 2021-03-03 PROCEDURE — 85610 PROTHROMBIN TIME: CPT

## 2021-03-03 PROCEDURE — 80305 DRUG TEST PRSMV DIR OPT OBS: CPT

## 2021-03-03 PROCEDURE — 80053 COMPREHEN METABOLIC PANEL: CPT

## 2021-03-03 NOTE — EDM.PDOCBH
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





  DAILY JHON   Administration


 


Tizanidine HCl  4 mg  03/04/21 19:53 





  Zanaflex  PO  





  Q6H PRN  





  Muscle Spasm  


 


Trazodone HCl  100 mg  03/04/21 21:00  03/04/21 20:38





  Trazodone  PO   100 mg





  BEDTIME JHON   Administration


 


Warfarin Sodium  5 mg  03/05/21 13:00  03/05/21 13:51





  Coumadin  PO   Not Given





  DAILY@[facility]  











Discharge vs Psych Eval/Treatment:: 





03/05/21 10:06


potassium was rechecked and it was 3.5 and had been 2.9. He had a inr today 

which was 6. He was given vit k 3 mg. will get another inr this pm or in the am.

No coumadin today. The plan is for him to go home this pm or in the am. Home 

care will be supporting the pt. He will be receiving counseling.  The wife is in

the process of locking up all knifes and guns. 


03/05/21 12:00


A consult was obtained with Dr Gagnon. He felt he should have medication changes.

He would like to stop the zoloft and start cymbalta 60 mg qam and abilify 2 mg  

Qam.  He would like to followup with the pt in 2 weeks virtually. The number to 

call would be [phone] He would also stop the trazone 100-mg  at bedtime.

## 2021-03-04 LAB — APAP SERPL-MCNC: 0 UG/ML (ref 10–30)

## 2021-03-04 RX ADMIN — DILTIAZEM HYDROCHLORIDE SCH MG: 180 CAPSULE, COATED, EXTENDED RELEASE ORAL at 20:37

## 2021-03-04 RX ADMIN — POTASSIUM CHLORIDE SCH MEQ: 1500 TABLET, EXTENDED RELEASE ORAL at 20:38

## 2021-03-05 RX ADMIN — POTASSIUM CHLORIDE SCH: 1500 TABLET, EXTENDED RELEASE ORAL at 23:57

## 2021-03-05 RX ADMIN — DILTIAZEM HYDROCHLORIDE SCH MG: 180 CAPSULE, COATED, EXTENDED RELEASE ORAL at 10:01

## 2021-03-05 RX ADMIN — POTASSIUM CHLORIDE SCH MEQ: 1500 TABLET, EXTENDED RELEASE ORAL at 10:03

## 2021-03-05 NOTE — CONS
DATE OF SERVICE:  03/05/2021

 

REFERRING PHYSICIAN:

 

CONSULTING PHYSICIAN:  Kadeem Gagnon MD

 

Site where the services are provided is [facility] in [place], [place].  Site where the services are provided from our offices

in [facility].  Length of service for this 60-minute emergency room clinical event is 60

minutes.

 

IDENTIFICATION:  The patient is a 65-year-old male who is admitted to the inpatient

emergency room unit at [facility] in [place], [place] after threatening to kill himself with a gun.  He was brought in by police.  He

has been monitored in the emergency room now and is seen for psychiatric consultation per

the request of staff attending, Dr. Lowe, and her treatment team.

 

CHIEF COMPLAINT:  "I am , and the wife had a few words with me, and I felt I was not

good enough."

 

HISTORY OF PRESENT ILLNESS:  The patient is a 65-year-old male who was threatening suicide

with a gun in the face of alcohol intoxication.  He states that I started drinking heavy

about 2 weeks ago "because he was feeling more depressed."  Evidently, he was threatening to

shoot himself in the head and "he was pointing it" around at different parts of his body.

Now that the patient has been brought in for evaluation, and he has sobered up.  He is very

remorseful, and he denies that he is suicidal at this point in time.  He denies that he is

homicidal.  He denies any psychotic, delusional, or paranoid symptoms.  He is sourav

for safety, and he states that he will not drink anymore.  He states that he does struggle

with depression, feelings of low self-esteem, and guilt as well as anxiety and some mood

swings.  He states he had variable sleep secondary to pain.  He racing thoughts,

ruminations, and poor appetite.  He has lot of medical issues as well.  He states that he

has been taking Zoloft and trazodone "for couple of years now," but he does not feel that

they have done anything meaningful for him.  He has guns at home and knives at home, but

they have all been removed according to his wife and staff reporting, and again, the patient

is sourav for safety, but he does want something to help him feel better.

 

PSYCHIATRIC MEDICATIONS AT PRESENTATION:

1. Zoloft 50 mg daily.

2. Trazodone 100 mg at bedtime.

 

ALLERGIES:

1. THE PATIENT IS ALLERGIC TO CARVEDILOL.

2. THE PATIENT IS ALLERGIC TO SPIRONOLACTONE.

 

 

PAST MEDICAL HISTORY:

1. CHF.

2. Atrial fibrillation.

3. Diabetes.

4. Sleep apnea.

5. Obesity.

6. Neuropathy.

 

REVIEW OF SYSTEMS:  Aside from cardiovascular, endocrine, musculoskeletal, and neurologic

all other major organ systems are negative at this point in time for acute difficulties or

complications.

 

FAMILY PSYCHIATRIC AND CD HISTORY:  The patient reports mother and father both struggled

with depression.

 

PAST PSYCHIATRIC AND CD HISTORY:  The patient reports 1 psychiatric hospitalization at 24

years of age.  He denies any chemical dependency treatments in the past.  He denies any

previous suicide attempts.  He denies any self-injurious behavior history.  He reports no

eating disorder history or abuse issues while being raised.  He denies any past psychiatric

medication history.

 

SOCIAL HISTORY:  The patient was born and raised in [place], [place].  He has been living

in [place], [place] with his third wife.  He has been living in the area for about past 27

years.  He has been  x3.  His current marriage is for 11 years.  He has 3 children

from the first marriage, 5 children from his second marriage.  He has a good relationship

with the majority of his children.  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

on disability for about 15 years secondary to congestive heart failure.  He denies any prior

 service or any legal difficulties.  He is Denominational in terms of his walter formation.

He enjoys outdoor activities in [place].

 

MENTAL STATUS EXAMINATION:  The patient is a 65-year-old white male in no apparent distress.

Speech is regular rate and rhythm.  The patient is cognitively oriented x3.  Psychomotor

activity is within normal limits.  There are no abnormal motor movements or tics observed.

Gait and station are not observed as the patient is sitting on an emergency room bay on a

gurney.  Mood is depressed and anxious.  Affect is restricted but cooperative overall for

the purposes of the emergency room consult.  There is no behavioral or stated evidence of

acute suicidal or homicidal ideation or acute psychotic, delusional, or paranoid symptoms.

Thought processes are significant for racing thoughts and ruminations.  However, there is no

manic symptoms or loose associations evident.  Judgement and insight appear unimpaired at

this point in time.  Motivation for help is good.

 

PHYSICAL EXAMINATION:

VITAL SIGNS:  5 feet 9 inches tall, 375 pounds.  143/79, 86, 20, and 98.6 degrees.

 

IMPRESSION:  Axis I:

1. Bipolar affective disease, mixed type, F31.60.

2. Anxiety disorder, not otherwise specified, F41.9.

3. Alcohol abuse verus dependence.

4. Rule out posttraumatic stress disorder.

5. Rule out major depressive disorder.

 

Axis II:  None.

 

Axis III:

1. Congestive heart failure.

2. Atrial fibrillation.

3. Diabetes.

4. Sleep apnea.

5. Obesity.

6. Neuropathy.

 

Axis IV:  Severe.

 

Axis V:  55 to 60.

 

PLAN:

1. Discontinue Zoloft.

2. Discontinue trazodone.

3. Begin trial of Cymbalta 60 mg q.a.m. for symptoms of depression as well as neuropathic

    pain reduction.

4. Begin trial of Abilify 2 mg q.a.m. for clarity of thought, mood stability, and

    elimination of any psychotic or paranoid symptoms.

5. The patient is apprised of benefits and side effects of his newly initiated psychiatric

    medication regimen.  He acknowledges understanding of these.  He has no further

    questions by the end of interview session.

6. Sobriety.

7. Recommend that patient maintain good hydration status to help with full function

    throughout the day and also explore dietary modification to help with weight loss.

8. Recommend the patient to follow up with outpatient psychiatry approximately 2 weeks

    from discharge from the emergency room to assess his overall function and efficacy of

    his newly recommended psychiatric medication regimen.

9. We will follow up with patient sooner if there are any complications in interm, but the

    patient does appear safe to be discharged from emergency room now from a psychiatric

    standpoint as he does not appear to be an acute danger to himself or others at this

    point in time.

10.A crisis plan is in place.

 

 

 

 

Kadeem Gagnon MD

DD:  03/05/2021 12:07:10

DT:  03/05/2021 14:24:43

Job #:  [id number]

## 2021-03-06 RX ADMIN — POTASSIUM CHLORIDE SCH MEQ: 1500 TABLET, EXTENDED RELEASE ORAL at 09:33

## 2021-03-06 RX ADMIN — DILTIAZEM HYDROCHLORIDE SCH MG: 180 CAPSULE, COATED, EXTENDED RELEASE ORAL at 09:34

## 2021-12-18 ENCOUNTER — HOSPITAL ENCOUNTER (EMERGENCY)
Dept: HOSPITAL 11 - JP.ED | Age: 66
LOS: 1 days | Discharge: NURSING, REHAB OR LONG-TERM CARE | End: 2021-12-19
Payer: MEDICARE

## 2021-12-18 DIAGNOSIS — E11.65: ICD-10-CM

## 2021-12-18 DIAGNOSIS — Z79.899: ICD-10-CM

## 2021-12-18 DIAGNOSIS — I50.9: ICD-10-CM

## 2021-12-18 DIAGNOSIS — E11.42: ICD-10-CM

## 2021-12-18 DIAGNOSIS — L03.115: ICD-10-CM

## 2021-12-18 DIAGNOSIS — I48.21: ICD-10-CM

## 2021-12-18 DIAGNOSIS — A41.9: Primary | ICD-10-CM

## 2021-12-18 DIAGNOSIS — E66.01: ICD-10-CM

## 2021-12-18 DIAGNOSIS — I95.9: ICD-10-CM

## 2021-12-18 DIAGNOSIS — Z79.84: ICD-10-CM

## 2021-12-18 DIAGNOSIS — N17.9: ICD-10-CM

## 2021-12-18 DIAGNOSIS — E11.51: ICD-10-CM

## 2021-12-18 DIAGNOSIS — N30.00: ICD-10-CM

## 2021-12-18 DIAGNOSIS — I25.2: ICD-10-CM

## 2021-12-18 DIAGNOSIS — L03.116: ICD-10-CM

## 2021-12-18 DIAGNOSIS — Z88.8: ICD-10-CM

## 2021-12-18 DIAGNOSIS — J18.9: ICD-10-CM

## 2021-12-18 LAB — SARS-COV-2 RNA RESP QL NAA+PROBE: NEGATIVE

## 2021-12-18 PROCEDURE — 81001 URINALYSIS AUTO W/SCOPE: CPT

## 2021-12-18 PROCEDURE — 80053 COMPREHEN METABOLIC PANEL: CPT

## 2021-12-18 PROCEDURE — 0241U: CPT

## 2021-12-18 PROCEDURE — 87086 URINE CULTURE/COLONY COUNT: CPT

## 2021-12-18 PROCEDURE — 82728 ASSAY OF FERRITIN: CPT

## 2021-12-18 PROCEDURE — 85025 COMPLETE CBC W/AUTO DIFF WBC: CPT

## 2021-12-18 PROCEDURE — 83615 LACTATE (LD) (LDH) ENZYME: CPT

## 2021-12-18 PROCEDURE — 84484 ASSAY OF TROPONIN QUANT: CPT

## 2021-12-18 PROCEDURE — 84145 PROCALCITONIN (PCT): CPT

## 2021-12-18 PROCEDURE — 71045 X-RAY EXAM CHEST 1 VIEW: CPT

## 2021-12-18 PROCEDURE — 82009 KETONE BODYS QUAL: CPT

## 2021-12-18 PROCEDURE — 83605 ASSAY OF LACTIC ACID: CPT

## 2021-12-18 PROCEDURE — 99285 EMERGENCY DEPT VISIT HI MDM: CPT

## 2021-12-18 PROCEDURE — 96366 THER/PROPH/DIAG IV INF ADDON: CPT

## 2021-12-18 PROCEDURE — 96367 TX/PROPH/DG ADDL SEQ IV INF: CPT

## 2021-12-18 PROCEDURE — 87040 BLOOD CULTURE FOR BACTERIA: CPT

## 2021-12-18 PROCEDURE — 85379 FIBRIN DEGRADATION QUANT: CPT

## 2021-12-18 PROCEDURE — 36415 COLL VENOUS BLD VENIPUNCTURE: CPT

## 2021-12-18 PROCEDURE — 82803 BLOOD GASES ANY COMBINATION: CPT

## 2021-12-18 PROCEDURE — 82947 ASSAY GLUCOSE BLOOD QUANT: CPT

## 2021-12-18 PROCEDURE — 96365 THER/PROPH/DIAG IV INF INIT: CPT

## 2021-12-18 NOTE — EDM.PDOC
ED HPI GENERAL MEDICAL PROBLEM





- General


Chief Complaint: General


Stated Complaint: HIGH BLOOD SUGAR, DEHYDRATED


Time Seen by Provider: 12/18/21 20:50


Source of Information: Reports: Patient, Family, Old Records


History Limitations: Reports: No Limitations





- History of Present Illness


INITIAL COMMENTS - FREE TEXT/NARRATIVE: 


Jose is a 66-year-old male presenting to the ED for evaluation of increased 

weakness, tiredness, not feeling well, high blood glucose, not eating or 

drinking, and just a general decline in his total condition over the last 

several weeks.  The patient normally sees Sven Marcial CNP for management of his 

diabetes but does not check his blood sugars and according to the wife has not 

been very compliant with his medications.  He has been sleeping most of the day 

and when he awakes he is confused.  He has numerous skin ulcerations on both 

feet and in his perineal area from sitting in his wheelchair all day when he is 

not sleeping.  He has very limited mobility.  Patient complains of nausea and 

generally just not feeling well.  He is very vague in his symptoms.  He 

repeatedly states that he just wants to go home.








- Related Data


                                    Allergies











Allergy/AdvReac Type Severity Reaction Status Date / Time


 


carvedilol [From Coreg] Allergy  Other Verified 12/18/21 20:36


 


spironolactone Allergy  Hives Verified 12/18/21 20:36











Home Meds: 


                                    Home Meds





Diltiazem HCl [Diltiazem 24Hr ER] 180 mg PO DAILY 07/16/20 [History]


Furosemide 40 mg PO DAILY 07/16/20 [History]


Magnesium Oxide 800 mg PO BID 07/16/20 [History]


Metoprolol Succinate 75 mg PO DAILY 07/16/20 [History]


allopurinoL [Zyloprim] 300 mg PO BID 07/16/20 [History]


hydrALAZINE [Apresoline] 25 mg PO TID 07/16/20 [History]


traZODone 100 mg PO BEDTIME PRN 07/16/20 [History]


Amiodarone [Cordarone] 200 mg PO DAILY 07/19/20 [History]


Folic Acid 1 mg PO DAILY 07/19/20 [History]


Multivitamin [Multi-Vitamin Daily] 1 each PO DAILY 07/19/20 [History]


Nitroglycerin [Nitrostat] 0.4 mg PO Q5M PRN 07/19/20 [History]


metFORMIN HCl [Metformin HCl] 1,000 mg PO BID 07/19/20 [History]


ondansetron HCL [Zofran] 4 mg PO Q8H PRN 07/19/20 [History]


Fluticasone/Vilanterol [Breo Ellipta 100-25 MCG Inhalation Kit] 2 inhalation INH

DAILY 07/21/20 [History]


Acetaminophen [Tylenol] 1 - 2 tab PO Q4H PRN 11/06/20 [History]


Gabapentin [Neurontin] 300 mg PO BID 11/06/20 [History]


atorvaSTATin [Lipitor] 10 mg PO BEDTIME 11/06/20 [History]


Acetaminophen [Tylenol] 650 mg PO Q4H PRN  tablet 11/08/20 [Rx]


Docusate Sodium/Sennosides [Senna Plus] 1 tab PO BID PRN  tablet 11/08/20 [Rx]


Potassium Chloride 40 meq PO BID 03/03/21 [History]


metOLazone [Metolazone] 5 mg PO DAILY 03/03/21 [History]


tiZANidine [Zanaflex] 4 mg PO Q6H PRN 03/03/21 [History]


DULoxetine [Cymbalta] 60 mg PO DAILY 12/18/21 [History]











Past Medical History


HEENT History: Reports: Other (See Below)


Other HEENT History: esophageal varices


Cardiovascular History: Reports: Afib, Heart Failure, MI


Gastrointestinal History: Reports: Other (See Below)


Other Gastrointestinal History: esophageal varices (one banded on 11/06/2020)


Genitourinary History: Reports: Urinary Incontinence


Psychiatric History: Reports: Addiction, Other (See Below)


Other Psychiatric History: past ETOH addiction. Wife states has not drank for 

2.5 months


Endocrine/Metabolic History: Reports: Diabetes, Type II, Obesity/BMI 30+


Hematologic History: Reports: Anticoagulation Therapy





- Infectious Disease History


Infectious Disease History: Reports: Chicken Pox





- Past Surgical History


GI Surgical History: Reports: Cholecystectomy





Social & Family History





- Family History


Family Medical History: Unobtainable





- Tobacco Use


Tobacco Use Status *Q: Never Tobacco User





- Caffeine Use


Caffeine Use: Reports: Soda


Caffeine Use Comment: unable to obtain





- Recreational Drug Use


Recreational Drug Use: No





ED ROS GENERAL





- Review of Systems


Review Of Systems: See Below


Constitutional: Reports: Malaise, Weakness, Fatigue, Decreased Appetite


HEENT: Reports: No Symptoms


Respiratory: Reports: Shortness of Breath (Patient is chronically short of 

breath but he is noted to be intermittently hypoxic in the ED on room air.)


Cardiovascular: Reports: Orthopnea


Endocrine: Reports: Fatigue, High Glucose


GI/Abdominal: Reports: Decreased Appetite


: Reports: Other (Very concentrated urine)


Musculoskeletal: Reports: Muscle Pain (Generalized muscle pain)


Skin: Reports: Wound (Multiple wounds on both feet), Lesions (Lesions and 

erythema in the scrotum and perineal area from chronic pressure in the 

wheelchair, grade 1 decubiti)


Neurological: Reports: Confusion, Other (Patient is sleeping much more than 

usual)


Psychiatric: Reports: Confusion


Hematologic/Lymphatic: Reports: No Symptoms


Immunologic: Reports: No Symptoms





ED EXAM, GENERAL





- Physical Exam


Exam: See Below


Exam Limited By: Physical Impairment


General Appearance: Lethargic, Mild Distress, Obese (Morbidly obese)


Eye Exam: Bilateral Eye: EOMI, PERRL


Throat/Mouth: Normal Oropharynx, Normal Voice, No Airway Compromise


Head: Atraumatic, Normocephalic


Neck: Normal Inspection, Supple


Respiratory/Chest: No Respiratory Distress, Lungs Clear, No Accessory Muscle 

Use, Decreased Breath Sounds (Bibasilar diminished breath sounds).  No: 

Crackles, Rales, Rhonchi, Wheezing


Cardiovascular: Normal Peripheral Pulses, No Murmur, Irregularly Irregular 

(History of chronic atrial fibrillation ventricular rate is between 60 and 90 

bpm)


Peripheral Pulses: 2+: Radial (L), Radial (R)


GI/Abdominal: Soft, Non-Tender, Abnormal Bowel Sounds (Diminished bowel sounds)


 (Male) Exam: Other (Grade 1 decubiti involving the scrotum and perineal area)


Extremities: Pedal Edema (1-2+ bilateral lower extremity edema), Other (Chronic 

venous stasis dermatitis both lower extremities below the knees.)


Neurological: Alert, Oriented, Normal Cognition, Sensory/Motor Deficit 

(Significant light touch sensation loss in both lower extremities due to 

peripheral neuropathy.)


Psychiatric: Flat Affect


Skin Exam: Wound/Incision (Several lesions on both feet from previous 

blistering.  Erythema of both feet and edema.  Venous stasis dermatitis of both 

lower extremities from the knees down.)





Course





- Vital Signs


Last Recorded V/S: 


                                Last Vital Signs











Temp  36.2 C   12/18/21 20:49


 


Pulse  82   12/19/21 00:30


 


Resp  10 L  12/19/21 00:30


 


BP  100/65   12/19/21 00:30


 


Pulse Ox  95   12/19/21 00:30














- Orders/Labs/Meds


Orders: 


                               Active Orders 24 hr











 Category Date Time Status


 


 Blood Glucose Check, Bedside [RC] Q4HR Care  12/18/21 21:43 Active


 


 Blood Pressure Mgt: Sepsis [RC] Q15MX2 Care  12/18/21 21:41 Active


 


 Chest 1V Frontal [CR] Stat Exams  12/18/21 20:34 Taken


 


 CULTURE BLOOD [BC] Urgent Lab  12/18/21 21:45 Received


 


 CULTURE BLOOD [BC] Urgent Lab  12/18/21 21:55 Received


 


 CULTURE URINE [RM] Stat Lab  12/19/21 00:36 Received


 


 Norepinephrine [Levophed] 4 mg Med  12/18/21 22:00 Active





 Dextrose 5% in Water 246 ml   





 IV TITRATE   


 


 Sodium Chloride 0.9% [Normal Saline] 1,000 ml Med  12/18/21 21:45 Active





 IV ASDIRECTED   


 


 Sodium Chloride 0.9% [Normal Saline] 1,000 ml Med  12/19/21 00:15 Active





 IV ASDIRECTED   


 


 Sodium Chloride 0.9% [Normal Saline] 1,000 ml Med  12/19/21 01:30 Active





 IV ASDIRECTED   


 


 Sodium Chloride 0.9% [Saline Flush] Med  12/18/21 20:34 Active





 10 ml FLUSH ASDIRECTED PRN   


 


 Blood Culture x2 Reflex Set [OM.PC] Urgent Oth  12/18/21 21:40 Ordered


 


 Isolation [COMM] Stat Oth  12/18/21 20:35 Ordered


 


 Saline Lock Insert [OM.PC] Routine Oth  12/18/21 20:34 Ordered


 


 Severe Sepsis Onset Time [OM.PC] Stat Oth  12/18/21 21:40 Ordered








                                Medication Orders





Sodium Chloride (Normal Saline)  1,000 mls @ 999 mls/hr IV ASDIRECTED JHON


   Last Admin: 12/18/21 21:52  Dose: 999 mls/hr


   Documented by: RICH


Norepinephrine Bitartrate 4 mg (/ Dextrose/Water)  250 mls @ 7.5 mls/hr IV 

TITRATE JHON; Protocol


   Last Admin: 12/18/21 22:45  Dose: 2 mcg/min, 7.5 mls/hr


   Documented by: RICH


Sodium Chloride (Normal Saline)  1,000 mls @ 999 mls/hr IV ASDIRECTED JHON


   Last Admin: 12/19/21 00:08  Dose: 999 mls/hr


   Documented by: RICH


Sodium Chloride (Normal Saline)  1,000 mls @ 999 mls/hr IV ASDIRECTED JHON


   Last Admin: 12/19/21 01:00  Dose: 999 mls/hr


   Documented by: RICH


Sodium Chloride (Sodium Chloride 0.9% 10 Ml Syringe)  10 ml FLUSH ASDIRECTED PRN


   PRN Reason: Keep Vein Open


   Last Admin: 12/18/21 21:28  Dose: 10 ml


   Documented by: RICH








Labs: 


                                Laboratory Tests











  12/18/21 12/18/21 12/18/21 Range/Units





  20:45 20:48 20:50 


 


WBC    7.6  (4.5-11.0)  K/uL


 


RBC    4.96  (4.30-5.90)  M/uL


 


Hgb    14.1  (12.0-15.0)  g/dL


 


Hct    43.9  (40.0-54.0)  %


 


MCV    89  (80-98)  fL


 


MCH    28  (27-31)  pg


 


MCHC    32  (32-36)  %


 


Plt Count    236  (150-400)  K/uL


 


Neut % (Auto)    63.2  (36-66)  %


 


Lymph % (Auto)    22.0 L  (24-44)  %


 


Mono % (Auto)    12.0 H  (2-6)  %


 


Eos % (Auto)    1.6 L  (2-4)  %


 


Baso % (Auto)    1.2 H  (0-1)  %


 


D-Dimer, Quantitative   964.66 H   (0.0-500.0)  ng/mL


 


ABG Hemoglobin     (13.5-18.0)  g/dL


 


ABG Oxyhemoglobin     %


 


ABG Carboxyhemoglobin     (0.0-1.6)  %


 


ABG Methemoglobin     %


 


VBG pH     (7.350-7.450)  


 


VBG pCO2     mm/Hg


 


VBG pO2     mm/Hg


 


VBG HCO3     mmol/L


 


VBG Total CO2     mmol/L


 


VBG O2 Saturation     


 


VBG O2 Content     %vol


 


VBG Base Excess     mm/L


 


O2 Delivery Device     


 


Sodium     (140-148)  mmol/L


 


Potassium     (3.6-5.2)  mmol/L


 


Chloride     (100-108)  mmol/L


 


Carbon Dioxide     (21-32)  mmol/L


 


Anion Gap     (5.0-14.0)  mmol/L


 


BUN     (7-18)  mg/dL


 


Creatinine     (0.8-1.3)  mg/dL


 


Est Cr Clr Drug Dosing     mL/min


 


Estimated GFR (MDRD)     (>60)  


 


Glucose     ()  mg/dL


 


POC Glucose     ()  mg/dL


 


Lactic Acid     (0.4-2.0)  mmol/L


 


Calcium     (8.5-10.1)  mg/dL


 


Ferritin     (8-388)  ng/ml


 


Total Bilirubin     (0.2-1.0)  mg/dL


 


AST     (15-37)  U/L


 


ALT     (12-78)  U/L


 


Alkaline Phosphatase     ()  U/L


 


Lactate Dehydrogenase     ()  U/L


 


Troponin I High Sens     (<=60.3)  pg/mL


 


Total Protein     (6.4-8.2)  g/dL


 


Albumin     (3.4-5.0)  g/dL


 


Globulin     (2.3-3.5)  g/dL


 


Albumin/Globulin Ratio     (1.2-2.2)  


 


Procalcitonin     ng/mL


 


Urine Color     (YELLOW)  


 


Urine Appearance     (CLEAR)  


 


Urine pH     (5.0-8.0)  


 


Ur Specific Gravity     (1.008-1.030)  


 


Urine Protein     (NEGATIVE)  mg/dL


 


Urine Glucose (UA)     (NEGATIVE)  mg/dL


 


Urine Ketones     (NEGATIVE)  mg/dL


 


Urine Occult Blood     (NEGATIVE)  


 


Urine Nitrite     (NEGATIVE)  


 


Urine Bilirubin     (NEGATIVE)  


 


Urine Urobilinogen     (0.2-1.0)  EU/dL


 


Ur Leukocyte Esterase     (NEGATIVE)  


 


Urine RBC     (0-5)  


 


Urine WBC     (0-5)  


 


Ur Epithelial Cells     


 


Amorphous Sediment     


 


Urine Bacteria     


 


Urine Mucus     


 


Ketones     (NEGATIVE)  


 


Influenza Type A RNA  Negative    (NEGATIVE)  


 


RSV RNA (INAAT)  Negative    (NEGATIVE)  


 


Influenza Type B RNA  Negative    (NEGATIVE)  


 


SARS-CoV-2 RNA (SIMA)  Negative    (NEGATIVE)  














  12/18/21 12/18/21 12/18/21 Range/Units





  20:50 20:50 20:50 


 


WBC     (4.5-11.0)  K/uL


 


RBC     (4.30-5.90)  M/uL


 


Hgb     (12.0-15.0)  g/dL


 


Hct     (40.0-54.0)  %


 


MCV     (80-98)  fL


 


MCH     (27-31)  pg


 


MCHC     (32-36)  %


 


Plt Count     (150-400)  K/uL


 


Neut % (Auto)     (36-66)  %


 


Lymph % (Auto)     (24-44)  %


 


Mono % (Auto)     (2-6)  %


 


Eos % (Auto)     (2-4)  %


 


Baso % (Auto)     (0-1)  %


 


D-Dimer, Quantitative     (0.0-500.0)  ng/mL


 


ABG Hemoglobin     (13.5-18.0)  g/dL


 


ABG Oxyhemoglobin     %


 


ABG Carboxyhemoglobin     (0.0-1.6)  %


 


ABG Methemoglobin     %


 


VBG pH     (7.350-7.450)  


 


VBG pCO2     mm/Hg


 


VBG pO2     mm/Hg


 


VBG HCO3     mmol/L


 


VBG Total CO2     mmol/L


 


VBG O2 Saturation     


 


VBG O2 Content     %vol


 


VBG Base Excess     mm/L


 


O2 Delivery Device     


 


Sodium  136 L    (140-148)  mmol/L


 


Potassium  4.0    (3.6-5.2)  mmol/L


 


Chloride  97 L    (100-108)  mmol/L


 


Carbon Dioxide  26    (21-32)  mmol/L


 


Anion Gap  17.0 H    (5.0-14.0)  mmol/L


 


BUN  24 H D    (7-18)  mg/dL


 


Creatinine  2.6 H D    (0.8-1.3)  mg/dL


 


Est Cr Clr Drug Dosing  26.13    mL/min


 


Estimated GFR (MDRD)  25 L    (>60)  


 


Glucose  281 H    ()  mg/dL


 


POC Glucose     ()  mg/dL


 


Lactic Acid   4.2 H   (0.4-2.0)  mmol/L


 


Calcium  8.2 L    (8.5-10.1)  mg/dL


 


Ferritin  309    (8-388)  ng/ml


 


Total Bilirubin  0.8  D    (0.2-1.0)  mg/dL


 


AST  37    (15-37)  U/L


 


ALT  29    (12-78)  U/L


 


Alkaline Phosphatase  168 H    ()  U/L


 


Lactate Dehydrogenase  154    ()  U/L


 


Troponin I High Sens  < 4.0    (<=60.3)  pg/mL


 


Total Protein  7.2    (6.4-8.2)  g/dL


 


Albumin  2.4 L    (3.4-5.0)  g/dL


 


Globulin  4.8 H    (2.3-3.5)  g/dL


 


Albumin/Globulin Ratio  0.5 L    (1.2-2.2)  


 


Procalcitonin    1.26  ng/mL


 


Urine Color     (YELLOW)  


 


Urine Appearance     (CLEAR)  


 


Urine pH     (5.0-8.0)  


 


Ur Specific Gravity     (1.008-1.030)  


 


Urine Protein     (NEGATIVE)  mg/dL


 


Urine Glucose (UA)     (NEGATIVE)  mg/dL


 


Urine Ketones     (NEGATIVE)  mg/dL


 


Urine Occult Blood     (NEGATIVE)  


 


Urine Nitrite     (NEGATIVE)  


 


Urine Bilirubin     (NEGATIVE)  


 


Urine Urobilinogen     (0.2-1.0)  EU/dL


 


Ur Leukocyte Esterase     (NEGATIVE)  


 


Urine RBC     (0-5)  


 


Urine WBC     (0-5)  


 


Ur Epithelial Cells     


 


Amorphous Sediment     


 


Urine Bacteria     


 


Urine Mucus     


 


Ketones     (NEGATIVE)  


 


Influenza Type A RNA     (NEGATIVE)  


 


RSV RNA (INAAT)     (NEGATIVE)  


 


Influenza Type B RNA     (NEGATIVE)  


 


SARS-CoV-2 RNA (SIMA)     (NEGATIVE)  














  12/18/21 12/18/21 12/18/21 Range/Units





  20:50 20:50 23:10 


 


WBC     (4.5-11.0)  K/uL


 


RBC     (4.30-5.90)  M/uL


 


Hgb     (12.0-15.0)  g/dL


 


Hct     (40.0-54.0)  %


 


MCV     (80-98)  fL


 


MCH     (27-31)  pg


 


MCHC     (32-36)  %


 


Plt Count     (150-400)  K/uL


 


Neut % (Auto)     (36-66)  %


 


Lymph % (Auto)     (24-44)  %


 


Mono % (Auto)     (2-6)  %


 


Eos % (Auto)     (2-4)  %


 


Baso % (Auto)     (0-1)  %


 


D-Dimer, Quantitative     (0.0-500.0)  ng/mL


 


ABG Hemoglobin  14.5    (13.5-18.0)  g/dL


 


ABG Oxyhemoglobin  34.6    %


 


ABG Carboxyhemoglobin  2.0 H    (0.0-1.6)  %


 


ABG Methemoglobin  0.8    %


 


VBG pH  7.343 L    (7.350-7.450)  


 


VBG pCO2  50.0    mm/Hg


 


VBG pO2  22.9    mm/Hg


 


VBG HCO3  26.4    mmol/L


 


VBG Total CO2  23.9    mmol/L


 


VBG O2 Saturation  35.6    


 


VBG O2 Content  7.0    %vol


 


VBG Base Excess  0.5    mm/L


 


O2 Delivery Device  Room air    


 


Sodium     (140-148)  mmol/L


 


Potassium     (3.6-5.2)  mmol/L


 


Chloride     (100-108)  mmol/L


 


Carbon Dioxide     (21-32)  mmol/L


 


Anion Gap     (5.0-14.0)  mmol/L


 


BUN     (7-18)  mg/dL


 


Creatinine     (0.8-1.3)  mg/dL


 


Est Cr Clr Drug Dosing     mL/min


 


Estimated GFR (MDRD)     (>60)  


 


Glucose     ()  mg/dL


 


POC Glucose    173 H  ()  mg/dL


 


Lactic Acid     (0.4-2.0)  mmol/L


 


Calcium     (8.5-10.1)  mg/dL


 


Ferritin     (8-388)  ng/ml


 


Total Bilirubin     (0.2-1.0)  mg/dL


 


AST     (15-37)  U/L


 


ALT     (12-78)  U/L


 


Alkaline Phosphatase     ()  U/L


 


Lactate Dehydrogenase     ()  U/L


 


Troponin I High Sens     (<=60.3)  pg/mL


 


Total Protein     (6.4-8.2)  g/dL


 


Albumin     (3.4-5.0)  g/dL


 


Globulin     (2.3-3.5)  g/dL


 


Albumin/Globulin Ratio     (1.2-2.2)  


 


Procalcitonin     ng/mL


 


Urine Color     (YELLOW)  


 


Urine Appearance     (CLEAR)  


 


Urine pH     (5.0-8.0)  


 


Ur Specific Gravity     (1.008-1.030)  


 


Urine Protein     (NEGATIVE)  mg/dL


 


Urine Glucose (UA)     (NEGATIVE)  mg/dL


 


Urine Ketones     (NEGATIVE)  mg/dL


 


Urine Occult Blood     (NEGATIVE)  


 


Urine Nitrite     (NEGATIVE)  


 


Urine Bilirubin     (NEGATIVE)  


 


Urine Urobilinogen     (0.2-1.0)  EU/dL


 


Ur Leukocyte Esterase     (NEGATIVE)  


 


Urine RBC     (0-5)  


 


Urine WBC     (0-5)  


 


Ur Epithelial Cells     


 


Amorphous Sediment     


 


Urine Bacteria     


 


Urine Mucus     


 


Ketones   Negative   (NEGATIVE)  


 


Influenza Type A RNA     (NEGATIVE)  


 


RSV RNA (INAAT)     (NEGATIVE)  


 


Influenza Type B RNA     (NEGATIVE)  


 


SARS-CoV-2 RNA (SIMA)     (NEGATIVE)  














  12/19/21 12/19/21 Range/Units





  00:36 01:10 


 


WBC    (4.5-11.0)  K/uL


 


RBC    (4.30-5.90)  M/uL


 


Hgb    (12.0-15.0)  g/dL


 


Hct    (40.0-54.0)  %


 


MCV    (80-98)  fL


 


MCH    (27-31)  pg


 


MCHC    (32-36)  %


 


Plt Count    (150-400)  K/uL


 


Neut % (Auto)    (36-66)  %


 


Lymph % (Auto)    (24-44)  %


 


Mono % (Auto)    (2-6)  %


 


Eos % (Auto)    (2-4)  %


 


Baso % (Auto)    (0-1)  %


 


D-Dimer, Quantitative    (0.0-500.0)  ng/mL


 


ABG Hemoglobin    (13.5-18.0)  g/dL


 


ABG Oxyhemoglobin    %


 


ABG Carboxyhemoglobin    (0.0-1.6)  %


 


ABG Methemoglobin    %


 


VBG pH    (7.350-7.450)  


 


VBG pCO2    mm/Hg


 


VBG pO2    mm/Hg


 


VBG HCO3    mmol/L


 


VBG Total CO2    mmol/L


 


VBG O2 Saturation    


 


VBG O2 Content    %vol


 


VBG Base Excess    mm/L


 


O2 Delivery Device    


 


Sodium    (140-148)  mmol/L


 


Potassium    (3.6-5.2)  mmol/L


 


Chloride    (100-108)  mmol/L


 


Carbon Dioxide    (21-32)  mmol/L


 


Anion Gap    (5.0-14.0)  mmol/L


 


BUN    (7-18)  mg/dL


 


Creatinine    (0.8-1.3)  mg/dL


 


Est Cr Clr Drug Dosing    mL/min


 


Estimated GFR (MDRD)    (>60)  


 


Glucose    ()  mg/dL


 


POC Glucose    ()  mg/dL


 


Lactic Acid   1.9  (0.4-2.0)  mmol/L


 


Calcium    (8.5-10.1)  mg/dL


 


Ferritin    (8-388)  ng/ml


 


Total Bilirubin    (0.2-1.0)  mg/dL


 


AST    (15-37)  U/L


 


ALT    (12-78)  U/L


 


Alkaline Phosphatase    ()  U/L


 


Lactate Dehydrogenase    ()  U/L


 


Troponin I High Sens    (<=60.3)  pg/mL


 


Total Protein    (6.4-8.2)  g/dL


 


Albumin    (3.4-5.0)  g/dL


 


Globulin    (2.3-3.5)  g/dL


 


Albumin/Globulin Ratio    (1.2-2.2)  


 


Procalcitonin    ng/mL


 


Urine Color  Yellow   (YELLOW)  


 


Urine Appearance  Cloudy A   (CLEAR)  


 


Urine pH  5.5   (5.0-8.0)  


 


Ur Specific Gravity  >= 1.030   (1.008-1.030)  


 


Urine Protein  >=300 H   (NEGATIVE)  mg/dL


 


Urine Glucose (UA)  Negative   (NEGATIVE)  mg/dL


 


Urine Ketones  Negative   (NEGATIVE)  mg/dL


 


Urine Occult Blood  Moderate H   (NEGATIVE)  


 


Urine Nitrite  Negative   (NEGATIVE)  


 


Urine Bilirubin  Small H   (NEGATIVE)  


 


Urine Urobilinogen  4.0 H   (0.2-1.0)  EU/dL


 


Ur Leukocyte Esterase  Large H   (NEGATIVE)  


 


Urine RBC  0-5   (0-5)  


 


Urine WBC  >100 H   (0-5)  


 


Ur Epithelial Cells  Rare   


 


Amorphous Sediment  Not seen   


 


Urine Bacteria  Many   


 


Urine Mucus  Not seen   


 


Ketones    (NEGATIVE)  


 


Influenza Type A RNA    (NEGATIVE)  


 


RSV RNA (INAAT)    (NEGATIVE)  


 


Influenza Type B RNA    (NEGATIVE)  


 


SARS-CoV-2 RNA (SIMA)    (NEGATIVE)  











Meds: 


Medications











Generic Name Dose Route Start Last Admin





  Trade Name Freq  PRN Reason Stop Dose Admin


 


Sodium Chloride  1,000 mls @ 999 mls/hr  12/18/21 21:45  12/18/21 21:52





  Normal Saline  IV   999 mls/hr





  ASDIRECTED JHON   Administration


 


Norepinephrine Bitartrate 4 mg  250 mls @ 7.5 mls/hr  12/18/21 22:00  12/18/21 

22:45





  / Dextrose/Water  IV   2 mcg/min





  TITRATE JHON   7.5 mls/hr





    Administration





  Protocol  





  2 MCG/MIN  


 


Sodium Chloride  1,000 mls @ 999 mls/hr  12/19/21 00:15  12/19/21 00:08





  Normal Saline  IV   999 mls/hr





  ASDIRECTED JHON   Administration


 


Sodium Chloride  1,000 mls @ 999 mls/hr  12/19/21 01:30  12/19/21 01:00





  Normal Saline  IV   999 mls/hr





  ASDIRECTED JHON   Administration


 


Sodium Chloride  10 ml  12/18/21 20:34  12/18/21 21:28





  Sodium Chloride 0.9% 10 Ml Syringe  FLUSH   10 ml





  ASDIRECTED PRN   Administration





  Keep Vein Open  














Discontinued Medications














Generic Name Dose Route Start Last Admin





  Trade Name Freq  PRN Reason Stop Dose Admin


 


Lactated Ringer's  1,000 mls @ 999 mls/hr  12/18/21 21:45 





  Ringers, Lactated  IV  





  ASDIRECTED JHON  


 


Ceftriaxone Sodium 2 gm/  50 mls @ 100 mls/hr  12/18/21 21:40  12/18/21 21:46





  Sodium Chloride  IV  12/18/21 22:09  100 mls/hr





  STAT ONE   Administration


 


Vancomycin HCl 1.5 gm/ Sodium  250 mls @ 167 mls/hr  12/18/21 21:40  12/18/21 

22:46





  Chloride  IV  12/18/21 23:09  167 mls/hr





  STAT ONE   Administration


 


Insulin Human Regular  6 unit  12/18/21 21:40  12/18/21 22:15





  Insulin Regular, Human 100 Units/Ml 3 Ml Vial  IVPUSH  12/18/21 21:41  6 units





  ONETIME ONE   Administration














- Radiology Interpretation


Free Text/Narrative:: 


I reviewed the one-view portable chest x-ray on the patient and compared it to 

his previous on 6 November 2020.  There is increased haziness in the right base 

that was not previously seen suggestive for a pneumonia.








- Re-Assessments/Exams


Free Text/Narrative Re-Assessment/Exam: 





12/18/21 22:08 I reviewed the patient's labs showing a normal leukocyte count of

 7.6, hemoglobin of 14.1 and platelet count of 236,000.  His comprehensive 

metabolic panel shows a sodium 136, potassium 4.0, chloride of 97 with a 

bicarbonate of 26, BUN of 24 with a creatinine of 2.6 and a calculated GFR of 25

 indicating acute kidney failure stage IV and a glucose of 281.  The AST is 

normal at 37, ALT is 28, alkaline phosphatase is 168, calcium is 8.2.  The 

patient is negative for Covid, RSV, and influenza.  A venous blood gas was 

obtained and shows a pH of 7.34, PCO2 venous of 50, PO2 venous of 22.9, and a 

bicarbonate of 26.4.  D-dimer is elevated at 964, however, we are named able to 

do a CT angiogram because of the patient's poor kidney function with the GFR of 

25.  The venous lactate is elevated at 4.2.  The patient's ketone negative.  His

 high-sensitivity troponin is less than 4.0 which is normal.  His pro calcitonin

 is elevated at 1.26.  Given the big picture and the patient's hemodynamic 

instability, this is pointing strongly towards being septic.  I suspect that his

 primary source is cellulitis even his multiple leg lesions and perineal 

lesions.  He could also have urinary involvement and pneumonia.  We initiated 

sepsis protocol with infusion of IV normal saline at a bolus of 30 mL/kg and 

initiation of antibiotics with vancomycin 1500 mg IV and ceftriaxone 2 g IV.  

Blood cultures had been obtained prior to the initiation of antibiotics.  

Unfortunately, the patient does not produce any urine for us to check urinalysis

 or do a urine culture.  The patient's pressures have been fluctuating between 

the mid 80s systolic and low 100s.  I have initiated Levophed therapy if he 

continues to drop.  In the current state, the patient certainly needs to be 

admitted.  We have no beds available here so I reached out to [facility].  

The patient is full code.





12/19/21 00:00 I discussed the case with Dr. Childers, hospitalist at [facility] who agrees to accept the patient in transfer if we can wean the patient 

off of the Levophed while he remains vitally stable, check a urinalysis and 

bladder scan to make sure he is not obstructive, and recheck the lactate after 

the fluid bolus.  If the patient is unable to be weaned off the Levophed we will

 have to talk to the intensivist if there is an ICU bed available for the 

patient.





12/19/21 02:04 Alysis shows kath pyuria with greater than 100 WBCs and positive

 leukocyte Estrace.  It is nitrite negative.  There is 0-5 red cells.  The 

venous lactate performed after the infusion of 2 L of normal saline shows a drop

 from 4.2-1.9.  We are still in the process of weaning the patient off of 

Levophed as he is down to 1 birgit at this time.  His pressures appear to be 

holding at 112/79.





12/19/21 02:29 the patient is off of the Levophed at this time and his pressures

 are holding 106/64.  I discussed the case with Aliza, phone referral nurse at 

[facility] who accepts the patient on Dr. Childers's behalf for transfer to 

the MedSurg unit at [facility].  We will arrange for transport of the 

patient there.  It does appear that he is uroseptic.  Urine culture is pending. 

 Blood cultures are pending.








Departure





- Departure


Time of Disposition: 03:26


Disposition: DC/Tfer to Acute Hospital 02


Clinical Impression: 


 Sepsis associated hypotension, Morbid obesity with BMI of 50.0-59.9, adult, On 

Coumadin for atrial fibrillation, CHF, Congestive heart failure, Diabetic 

peripheral neuropathy associated with type 2 diabetes mellitus, Peripheral 

vascular disease due to secondary diabetes mellitus





Cellulitis


Qualifiers:


 Site of cellulitis: extremity Site of cellulitis of extremity: lower extremity 

Laterality: unspecified laterality Qualified Code(s): L03.119 - Cellulitis of 

unspecified part of limb





Right lower lobe pneumonia


Qualifiers:


 Pneumonia type: due to unspecified organism Qualified Code(s): J18.9 - 

Pneumonia, unspecified organism





Hyperglycemia due to type 2 diabetes mellitus


Qualifiers:


 Diabetes mellitus long term insulin use: without long term use Qualified 

Code(s): E11.65 - Type 2 diabetes mellitus with hyperglycemia





Atrial fibrillation


Qualifiers:


 Atrial fibrillation type: permanent Qualified Code(s): I48.21 - Permanent 

atrial fibrillation





Type 2 diabetes mellitus


Qualifiers:


 Diabetes mellitus long term insulin use: without long term use Diabetes geni

itus complication status: with hyperglycemia Qualified Code(s): E11.65 - Type 2 

diabetes mellitus with hyperglycemia





Acute kidney failure


Qualifiers:


 Acute renal failure type: unspecified Qualified Code(s): N17.9 - Acute kidney 

failure, unspecified





Urinary tract infection


Qualifiers:


 Urinary tract infection type: acute cystitis Hematuria presence: without 

hematuria Qualified Code(s): N30.00 - Acute cystitis without hematuria








- Discharge Information


Referrals: 


Sven Mills, NP [Primary Care Provider] - 


Forms:  ED Department Discharge





Critical Care Note





- Critical Care Note


Total Time (mins): 60


Comments: 


Critical care time of 60 minutes to manage this complex patient including review

of his medical records, review of labs and imaging, and therapeutic management. 

This excludes procedures.








Sepsis Event Note (ED)





- Evaluation


Sepsis Screening Result: No Definite Risk


Current Stage of Sepsis: Sepsis


Possible Source of Sepsis: Skin/Soft Tissue





- Focused Exam


Sepsis Event Note Statement: Focused Sepsis Exam Completed


Vital Signs: 


                                   Vital Signs











  Temp Pulse Resp BP Pulse Ox


 


 12/19/21 00:30   82  10 L  100/65  95


 


 12/19/21 00:05   87  11 L  117/70  94 L


 


 12/18/21 23:15   89  10 L  131/87  95


 


 12/18/21 23:00     127/84 


 


 12/18/21 22:45   88  12  99/66  95


 


 12/18/21 22:00   91   101/78 


 


 12/18/21 21:30   92  12  89/58 L  93 L


 


 12/18/21 21:00   93  13  93/69  97


 


 12/18/21 20:49  36.2 C  89  10 L  107/65  96


 


 12/18/21 20:34  36.2 C  89  10 L  107/65  96











Heart Sounds: Irregular


Capillary Refill, Detail: Greater than (>) 2 Seconds


Pulse Description: 1+ Thready


Peripheral Pulse Location: Radial


Date Exam was Performed: 12/18/21


Time Exam was Performed: 21:40





- Bedside Monitoring


Passive Leg Raise/Fluid Bolus: Fluid Responsive


Date Bedside Monitoring was Performed: 12/19/21


Time Bedside Monitoring was Performed: 02:14 (A beneficial effect with a fluid 

bolus, however, his pressures continue to still drop and Levophed was 

initiated.)





- Problem List & Annotations


(1) Sepsis associated hypotension


SNOMED Code(s): 95176587


   Code(s): A41.9 - SEPSIS, UNSPECIFIED ORGANISM; I95.9 - HYPOTENSION, 

UNSPECIFIED   Status: Acute   Priority: High   Current Visit: Yes   





(2) Cellulitis


SNOMED Code(s): 812866056


   Code(s): L03.90 - CELLULITIS, UNSPECIFIED   Status: Acute   Priority: High   

Current Visit: Yes   


Qualifiers: 


   Site of cellulitis: extremity   Site of cellulitis of extremity: lower 

extremity   Laterality: unspecified laterality   Qualified Code(s): L03.119 - 

Cellulitis of unspecified part of limb   





(3) Right lower lobe pneumonia


SNOMED Code(s): 900697243


   Code(s): J18.9 - PNEUMONIA, UNSPECIFIED ORGANISM   Status: Acute   Priority: 

High   Current Visit: Yes   


Qualifiers: 


   Pneumonia type: due to unspecified organism   Qualified Code(s): J18.9 - 

Pneumonia, unspecified organism   





(4) Hyperglycemia due to type 2 diabetes mellitus


SNOMED Code(s): 710946693409503, 637256293425456


   Code(s): E11.65 - TYPE 2 DIABETES MELLITUS WITH HYPERGLYCEMIA   Status: 

Chronic   Priority: High   Current Visit: Yes   


Qualifiers: 


   Diabetes mellitus long term insulin use: without long term use   Qualified 

Code(s): E11.65 - Type 2 diabetes mellitus with hyperglycemia   





(5) Atrial fibrillation


SNOMED Code(s): 42811023


   Code(s): I48.91 - UNSPECIFIED ATRIAL FIBRILLATION   Status: Chronic   

Priority: Medium   Current Visit: Yes   


Qualifiers: 


   Atrial fibrillation type: permanent   Qualified Code(s): I48.21 - Permanent 

atrial fibrillation   





(6) Morbid obesity with BMI of 50.0-59.9, adult


SNOMED Code(s): 242866116, 62626210289227


   Code(s): E66.01 - MORBID (SEVERE) OBESITY DUE TO EXCESS CALORIES; Z68.43 - 

BODY MASS INDEX [BMI] 50.0-59.9, ADULT   Status: Chronic   Priority: Medium   

Current Visit: Yes   





(7) Type 2 diabetes mellitus


SNOMED Code(s): 70773518


   Code(s): E11.9 - TYPE 2 DIABETES MELLITUS WITHOUT COMPLICATIONS   Status: 

Chronic   Priority: Medium   Current Visit: Yes   


Qualifiers: 


   Diabetes mellitus long term insulin use: without long term use   Diabetes 

mellitus complication status: with hyperglycemia   Qualified Code(s): E11.65 - 

Type 2 diabetes mellitus with hyperglycemia   





(8) Diabetic peripheral neuropathy associated with type 2 diabetes mellitus


SNOMED Code(s): 6437450577482, 7138020440527


   Code(s): E11.42 - TYPE 2 DIABETES MELLITUS WITH DIABETIC POLYNEUROPATHY   

Status: Chronic   Priority: Medium   Current Visit: Yes   





(9) Peripheral vascular disease due to secondary diabetes mellitus


SNOMED Code(s): 664895647, 709130015


   Code(s): E13.51 - OTH DIABETES W DIABETIC PERIPHERAL ANGIOPATHY W/O GANGRENE 

  Status: Chronic   Priority: Medium   Current Visit: Yes   





(10) Acute kidney failure


SNOMED Code(s): 60275323


   Code(s): N17.9 - ACUTE KIDNEY FAILURE, UNSPECIFIED   Status: Acute   

Priority: High   Current Visit: Yes   





(11) Urinary tract infection


SNOMED Code(s): 05485499


   Code(s): N39.0 - URINARY TRACT INFECTION, SITE NOT SPECIFIED   Status: Acute 

  Priority: High   Current Visit: Yes   


Qualifiers: 


   Urinary tract infection type: acute cystitis   Hematuria presence: without 

hematuria   Qualified Code(s): N30.00 - Acute cystitis without hematuria   





- Problem List Review


Problem List Initiated/Reviewed/Updated: Yes





- My Orders


Last 24 Hours: 


My Active Orders





12/18/21 20:34


Chest 1V Frontal [CR] Stat 


Sodium Chloride 0.9% [Saline Flush]   10 ml FLUSH ASDIRECTED PRN 


Saline Lock Insert [OM.PC] Routine 





12/18/21 20:35


Isolation [COMM] Stat 





12/18/21 21:40


Blood Culture x2 Reflex Set [OM.PC] Urgent 


Severe Sepsis Onset Time [OM.PC] Stat 





12/18/21 21:41


Blood Pressure Mgt: Sepsis [RC] Q15MX2 





12/18/21 21:43


Blood Glucose Check, Bedside [RC] Q4HR 





12/18/21 21:45


CULTURE BLOOD [BC] Urgent 


Sodium Chloride 0.9% [Normal Saline] 1,000 ml IV ASDIRECTED 





12/18/21 21:55


CULTURE BLOOD [BC] Urgent 





12/18/21 22:00


Norepinephrine [Levophed] 4 mg   Dextrose 5% in Water 246 ml IV TITRATE 





12/19/21 00:15


Sodium Chloride 0.9% [Normal Saline] 1,000 ml IV ASDIRECTED 





12/19/21 00:36


CULTURE URINE [RM] Stat 





12/19/21 01:30


Sodium Chloride 0.9% [Normal Saline] 1,000 ml IV ASDIRECTED 














- Assessment/Plan


Last 24 Hours: 


My Active Orders





12/18/21 20:34


Chest 1V Frontal [CR] Stat 


Sodium Chloride 0.9% [Saline Flush]   10 ml FLUSH ASDIRECTED PRN 


Saline Lock Insert [OM.PC] Routine 





12/18/21 20:35


Isolation [COMM] Stat 





12/18/21 21:40


Blood Culture x2 Reflex Set [OM.PC] Urgent 


Severe Sepsis Onset Time [OM.PC] Stat 





12/18/21 21:41


Blood Pressure Mgt: Sepsis [RC] Q15MX2 





12/18/21 21:43


Blood Glucose Check, Bedside [RC] Q4HR 





12/18/21 21:45


CULTURE BLOOD [BC] Urgent 


Sodium Chloride 0.9% [Normal Saline] 1,000 ml IV ASDIRECTED 





12/18/21 21:55


CULTURE BLOOD [BC] Urgent 





12/18/21 22:00


Norepinephrine [Levophed] 4 mg   Dextrose 5% in Water 246 ml IV TITRATE 





12/19/21 00:15


Sodium Chloride 0.9% [Normal Saline] 1,000 ml IV ASDIRECTED 





12/19/21 00:36


CULTURE URINE [RM] Stat 





12/19/21 01:30


Sodium Chloride 0.9% [Normal Saline] 1,000 ml IV ASDIRECTED

## 2022-02-16 ENCOUNTER — HOSPITAL ENCOUNTER (EMERGENCY)
Dept: HOSPITAL 11 - JP.ED | Age: 67
LOS: 1 days | Discharge: NURSING, REHAB OR LONG-TERM CARE | End: 2022-02-17
Payer: MEDICARE

## 2022-02-16 DIAGNOSIS — I25.10: ICD-10-CM

## 2022-02-16 DIAGNOSIS — Z20.822: ICD-10-CM

## 2022-02-16 DIAGNOSIS — I48.91: Primary | ICD-10-CM

## 2022-02-16 DIAGNOSIS — I25.2: ICD-10-CM

## 2022-02-16 DIAGNOSIS — Z88.8: ICD-10-CM

## 2022-02-16 DIAGNOSIS — K62.5: ICD-10-CM

## 2022-02-16 DIAGNOSIS — K21.9: ICD-10-CM

## 2022-02-16 DIAGNOSIS — Z79.899: ICD-10-CM

## 2022-02-16 DIAGNOSIS — I50.9: ICD-10-CM

## 2022-02-16 DIAGNOSIS — Z79.84: ICD-10-CM

## 2022-02-16 DIAGNOSIS — E11.9: ICD-10-CM

## 2022-02-16 DIAGNOSIS — Z90.49: ICD-10-CM

## 2022-02-16 LAB — SARS-COV-2 RNA RESP QL NAA+PROBE: NEGATIVE

## 2022-02-16 PROCEDURE — 96375 TX/PRO/DX INJ NEW DRUG ADDON: CPT

## 2022-02-16 PROCEDURE — 96366 THER/PROPH/DIAG IV INF ADDON: CPT

## 2022-02-16 PROCEDURE — 96365 THER/PROPH/DIAG IV INF INIT: CPT

## 2022-02-16 PROCEDURE — 84145 PROCALCITONIN (PCT): CPT

## 2022-02-16 PROCEDURE — 85610 PROTHROMBIN TIME: CPT

## 2022-02-16 PROCEDURE — 0241U: CPT

## 2022-02-16 PROCEDURE — 99285 EMERGENCY DEPT VISIT HI MDM: CPT

## 2022-02-16 PROCEDURE — 80053 COMPREHEN METABOLIC PANEL: CPT

## 2022-02-16 PROCEDURE — 71045 X-RAY EXAM CHEST 1 VIEW: CPT

## 2022-02-16 PROCEDURE — 96368 THER/DIAG CONCURRENT INF: CPT

## 2022-02-16 PROCEDURE — 80307 DRUG TEST PRSMV CHEM ANLYZR: CPT

## 2022-02-16 PROCEDURE — 93005 ELECTROCARDIOGRAM TRACING: CPT

## 2022-02-16 PROCEDURE — C9113 INJ PANTOPRAZOLE SODIUM, VIA: HCPCS

## 2022-02-16 PROCEDURE — 85025 COMPLETE CBC W/AUTO DIFF WBC: CPT

## 2022-02-16 PROCEDURE — 84484 ASSAY OF TROPONIN QUANT: CPT

## 2022-02-16 PROCEDURE — 87040 BLOOD CULTURE FOR BACTERIA: CPT

## 2022-02-16 PROCEDURE — 36415 COLL VENOUS BLD VENIPUNCTURE: CPT

## 2022-02-16 PROCEDURE — 83605 ASSAY OF LACTIC ACID: CPT

## 2022-02-16 PROCEDURE — 83880 ASSAY OF NATRIURETIC PEPTIDE: CPT

## 2022-02-16 PROCEDURE — 86140 C-REACTIVE PROTEIN: CPT

## 2022-06-17 ENCOUNTER — HOSPITAL ENCOUNTER (INPATIENT)
Dept: HOSPITAL 11 - JP.ED | Age: 67
LOS: 2 days | Discharge: HOME | DRG: 682 | End: 2022-06-19
Attending: INTERNAL MEDICINE | Admitting: FAMILY MEDICINE
Payer: MEDICARE

## 2022-06-17 DIAGNOSIS — H91.90: ICD-10-CM

## 2022-06-17 DIAGNOSIS — E66.01: ICD-10-CM

## 2022-06-17 DIAGNOSIS — G47.30: ICD-10-CM

## 2022-06-17 DIAGNOSIS — M10.9: ICD-10-CM

## 2022-06-17 DIAGNOSIS — F10.21: ICD-10-CM

## 2022-06-17 DIAGNOSIS — I25.2: ICD-10-CM

## 2022-06-17 DIAGNOSIS — I87.2: ICD-10-CM

## 2022-06-17 DIAGNOSIS — E11.42: ICD-10-CM

## 2022-06-17 DIAGNOSIS — E11.65: ICD-10-CM

## 2022-06-17 DIAGNOSIS — Z88.8: ICD-10-CM

## 2022-06-17 DIAGNOSIS — I11.9: ICD-10-CM

## 2022-06-17 DIAGNOSIS — F41.9: ICD-10-CM

## 2022-06-17 DIAGNOSIS — N17.9: Primary | ICD-10-CM

## 2022-06-17 DIAGNOSIS — R33.8: ICD-10-CM

## 2022-06-17 DIAGNOSIS — M54.9: ICD-10-CM

## 2022-06-17 DIAGNOSIS — Z91.19: ICD-10-CM

## 2022-06-17 DIAGNOSIS — K59.09: ICD-10-CM

## 2022-06-17 DIAGNOSIS — J96.01: ICD-10-CM

## 2022-06-17 DIAGNOSIS — I25.10: ICD-10-CM

## 2022-06-17 DIAGNOSIS — R32: ICD-10-CM

## 2022-06-17 DIAGNOSIS — Z87.01: ICD-10-CM

## 2022-06-17 DIAGNOSIS — Z79.84: ICD-10-CM

## 2022-06-17 DIAGNOSIS — F32.A: ICD-10-CM

## 2022-06-17 DIAGNOSIS — I49.9: ICD-10-CM

## 2022-06-17 DIAGNOSIS — I50.9: ICD-10-CM

## 2022-06-17 DIAGNOSIS — Z86.718: ICD-10-CM

## 2022-06-17 DIAGNOSIS — G89.29: ICD-10-CM

## 2022-06-17 DIAGNOSIS — K21.9: ICD-10-CM

## 2022-06-17 DIAGNOSIS — I48.21: ICD-10-CM

## 2022-06-17 DIAGNOSIS — N40.1: ICD-10-CM

## 2022-06-17 DIAGNOSIS — Z90.49: ICD-10-CM

## 2022-06-17 DIAGNOSIS — G47.33: ICD-10-CM

## 2022-06-17 DIAGNOSIS — Z79.01: ICD-10-CM

## 2022-06-17 DIAGNOSIS — F17.210: ICD-10-CM

## 2022-06-17 DIAGNOSIS — E78.5: ICD-10-CM

## 2022-06-17 DIAGNOSIS — M19.90: ICD-10-CM

## 2022-06-17 DIAGNOSIS — J18.9: ICD-10-CM

## 2022-06-17 DIAGNOSIS — I11.0: ICD-10-CM

## 2022-06-17 DIAGNOSIS — Z20.822: ICD-10-CM

## 2022-06-17 DIAGNOSIS — E78.00: ICD-10-CM

## 2022-06-17 DIAGNOSIS — Z79.899: ICD-10-CM

## 2022-06-17 DIAGNOSIS — N13.8: ICD-10-CM

## 2022-06-17 LAB — SARS-COV-2 RNA RESP QL NAA+PROBE: NEGATIVE

## 2022-06-17 PROCEDURE — 80048 BASIC METABOLIC PNL TOTAL CA: CPT

## 2022-06-17 PROCEDURE — 82947 ASSAY GLUCOSE BLOOD QUANT: CPT

## 2022-06-17 PROCEDURE — 85610 PROTHROMBIN TIME: CPT

## 2022-06-17 PROCEDURE — 71045 X-RAY EXAM CHEST 1 VIEW: CPT

## 2022-06-17 PROCEDURE — 36415 COLL VENOUS BLD VENIPUNCTURE: CPT

## 2022-06-17 PROCEDURE — 80053 COMPREHEN METABOLIC PANEL: CPT

## 2022-06-17 PROCEDURE — 94640 AIRWAY INHALATION TREATMENT: CPT

## 2022-06-17 PROCEDURE — 0241U: CPT

## 2022-06-17 PROCEDURE — 86140 C-REACTIVE PROTEIN: CPT

## 2022-06-17 PROCEDURE — 87040 BLOOD CULTURE FOR BACTERIA: CPT

## 2022-06-17 PROCEDURE — 81001 URINALYSIS AUTO W/SCOPE: CPT

## 2022-06-17 PROCEDURE — 84153 ASSAY OF PSA TOTAL: CPT

## 2022-06-17 PROCEDURE — 87086 URINE CULTURE/COLONY COUNT: CPT

## 2022-06-17 PROCEDURE — 93005 ELECTROCARDIOGRAM TRACING: CPT

## 2022-06-17 PROCEDURE — 85025 COMPLETE CBC W/AUTO DIFF WBC: CPT

## 2022-06-17 PROCEDURE — 83880 ASSAY OF NATRIURETIC PEPTIDE: CPT

## 2022-06-17 PROCEDURE — 83605 ASSAY OF LACTIC ACID: CPT

## 2022-06-18 RX ADMIN — INSULIN LISPRO SCH UNITS: 100 INJECTION, SOLUTION INTRAVENOUS; SUBCUTANEOUS at 22:08

## 2022-06-18 RX ADMIN — DILTIAZEM HYDROCHLORIDE SCH MG: 180 CAPSULE, COATED, EXTENDED RELEASE ORAL at 08:50

## 2022-06-18 RX ADMIN — INSULIN LISPRO SCH: 100 INJECTION, SOLUTION INTRAVENOUS; SUBCUTANEOUS at 16:53

## 2022-06-18 RX ADMIN — NYSTATIN SCH APPFUL: 100000 POWDER TOPICAL at 22:11

## 2022-06-18 RX ADMIN — NYSTATIN SCH APPFUL: 100000 POWDER TOPICAL at 14:36

## 2022-06-18 RX ADMIN — INSULIN LISPRO SCH: 100 INJECTION, SOLUTION INTRAVENOUS; SUBCUTANEOUS at 11:55

## 2022-06-19 RX ADMIN — INSULIN LISPRO SCH: 100 INJECTION, SOLUTION INTRAVENOUS; SUBCUTANEOUS at 13:26

## 2022-06-19 RX ADMIN — NYSTATIN SCH APPFUL: 100000 POWDER TOPICAL at 08:37

## 2022-06-19 RX ADMIN — DILTIAZEM HYDROCHLORIDE SCH MG: 180 CAPSULE, COATED, EXTENDED RELEASE ORAL at 08:37

## 2022-06-19 RX ADMIN — INSULIN LISPRO SCH UNITS: 100 INJECTION, SOLUTION INTRAVENOUS; SUBCUTANEOUS at 08:40

## 2022-07-07 NOTE — CRLCT
HISTORY:



Foot ulcers.



TECHNIQUE:



Noncontrast CT of the right and left feet.



COMPARISON:



Radiographs 07/19/2020 and 07/16/2020.



FINDINGS:



RIGHT FOOT: There is an apparent soft tissue wound involving the medial 

aspect of the forefoot adjacent to the 1st metatarsophalangeal joint. There 

is infiltration of the underlying soft tissues. Though assessment for fluid 

collections is limited by the noncontrast nature of this CT, no definite 

collection is seen in that region. There is no soft tissue gas. No bony 

destructive change in that region specific for acute osteomyelitis. There 

is no acute fracture. 1st MTP joint degenerative changes are present. Os 

peroneum. Plantar calcaneal spur. Spurring at the Achilles attachment to 

posterior calcaneus. Os trigonum. Chronic appearing ossicle within the 

proximal deltoid ligament. Infiltration of the subcutaneous tissues of the 

foot reflecting edema or cellulitis. Atrophy of the foot musculature likely 

indicating chronic denervation changes.



----



LEFT FOOT: Infiltration of the subcutaneous tissues of the foot may relate 

to edema or cellulitis. There is no soft tissue gas. No bony destructive 

change specific for acute osteomyelitis. Mild 1st MTP joint degenerative 

changes. Os trigonum. Os peroneum. Plantar calcaneal spur. Spurring at the 

Achilles attachment to posterior calcaneus. Atrophy of the foot musculature 

likely indicating chronic denervation changes.



IMPRESSION:



1. RIGHT FOOT: Soft tissue wound involving the medial aspect of the 

forefoot adjacent the 1st MTP joint space. No definite collection. No soft 

tissue gas. No bony destructive change specific for acute osteomyelitis. 

Infiltration of the subcutaneous tissues of the foot reflecting edema or 

cellulitis. 



2. LEFT FOOT: No definite collection. No soft tissue gas. No bony 

destructive change specific for acute osteomyelitis. Infiltration of the 

subcutaneous tissues of the foot reflecting edema or cellulitis.



Dictated by Shahzad Washington MD @ 7/20/2020 4:10:22 PM



Please note that all CT scans at this facility use dose modulation, 

iterative reconstruction, and/or weight-based dosing when appropriate to 

reduce radiation dose to as low as reasonably achievable.



Dictated by: Shahzad Washington MD @ 07/20/2020 16:11:30



(Electronically Signed) 6